# Patient Record
Sex: FEMALE | Race: WHITE | NOT HISPANIC OR LATINO
[De-identification: names, ages, dates, MRNs, and addresses within clinical notes are randomized per-mention and may not be internally consistent; named-entity substitution may affect disease eponyms.]

---

## 2017-01-18 ENCOUNTER — CHARTING TRANS (OUTPATIENT)
Dept: OTHER | Age: 52
End: 2017-01-18

## 2017-01-18 ASSESSMENT — PAIN SCALES - GENERAL: PAINLEVEL_OUTOF10: 0

## 2017-02-21 ENCOUNTER — HOSPITAL ENCOUNTER (OUTPATIENT)
Age: 52
Discharge: HOME | End: 2017-02-21
Attending: EMERGENCY MEDICINE
Payer: COMMERCIAL

## 2017-02-21 VITALS
WEIGHT: 130 LBS | TEMPERATURE: 99 F | HEART RATE: 70 BPM | SYSTOLIC BLOOD PRESSURE: 114 MMHG | HEIGHT: 65 IN | OXYGEN SATURATION: 100 % | DIASTOLIC BLOOD PRESSURE: 57 MMHG | BODY MASS INDEX: 21.66 KG/M2 | RESPIRATION RATE: 20 BRPM

## 2017-02-21 DIAGNOSIS — J06.9 VIRAL UPPER RESPIRATORY TRACT INFECTION: Primary | ICD-10-CM

## 2017-02-21 LAB — S PYO AG THROAT QL: NEGATIVE

## 2017-02-21 PROCEDURE — 87430 STREP A AG IA: CPT

## 2017-02-21 PROCEDURE — 99212 OFFICE O/P EST SF 10 MIN: CPT

## 2017-02-21 NOTE — ED PROVIDER NOTES
Patient Seen in: [facility]    History   Patient presents with:  Sore Throat    Stated Complaint: check for strep    HPI    42-year-old female patient presents her complaining of 3 days of sore throat associated with fati diagnosis)    Disposition:  Discharge    Follow-up:  Your primary care physician      If symptoms worsen      Medications Prescribed:  There are no discharge medications for this patient.

## 2017-05-17 ENCOUNTER — CHARTING TRANS (OUTPATIENT)
Dept: OTHER | Age: 52
End: 2017-05-17

## 2017-05-17 ASSESSMENT — PAIN SCALES - GENERAL: PAINLEVEL_OUTOF10: 0

## 2017-05-22 ENCOUNTER — LAB SERVICES (OUTPATIENT)
Dept: OTHER | Age: 52
End: 2017-05-22

## 2017-06-07 ENCOUNTER — CHARTING TRANS (OUTPATIENT)
Dept: OTHER | Age: 52
End: 2017-06-07

## 2017-06-07 LAB
ALBUMIN SERPL-MCNC: 4 G/DL (ref 3.6–5.1)
ALBUMIN/GLOB SERPL: 1.3 (ref 1–2.4)
ALP SERPL-CCNC: 51 UNITS/L (ref 45–117)
ALT SERPL-CCNC: 33 UNITS/L
ANION GAP SERPL CALC-SCNC: 12 MMOL/L (ref 10–20)
AST SERPL-CCNC: 24 UNITS/L
BASOPHILS # BLD: 0 K/MCL (ref 0–0.3)
BASOPHILS NFR BLD: 1 %
BILIRUB SERPL-MCNC: 0.6 MG/DL (ref 0.2–1)
BUN SERPL-MCNC: 12 MG/DL (ref 6–20)
BUN/CREAT SERPL: 16 (ref 7–25)
CALCIUM SERPL-MCNC: 9 MG/DL (ref 8.4–10.2)
CHLORIDE SERPL-SCNC: 104 MMOL/L (ref 98–107)
CHOLEST SERPL-MCNC: 197 MG/DL
CHOLEST/HDLC SERPL: 1.7
CO2 SERPL-SCNC: 30 MMOL/L (ref 21–32)
CREAT SERPL-MCNC: 0.74 MG/DL (ref 0.51–0.95)
DIFFERENTIAL METHOD BLD: ABNORMAL
EOSINOPHIL # BLD: 0.2 K/MCL (ref 0.1–0.5)
EOSINOPHIL NFR BLD: 3 %
ERYTHROCYTE [DISTWIDTH] IN BLOOD: 12 % (ref 11–15)
GLOBULIN SER-MCNC: 3.1 G/DL (ref 2–4)
GLUCOSE SERPL-MCNC: 96 MG/DL (ref 65–99)
HDLC SERPL-MCNC: 115 MG/DL
HEMATOCRIT: 40.8 % (ref 36–46.5)
HEMOGLOBIN: 14 G/DL (ref 12–15.5)
LDLC SERPL CALC-MCNC: 74 MG/DL
LENGTH OF FAST TIME PATIENT: 12 HRS
LENGTH OF FAST TIME PATIENT: 12 HRS
LYMPHOCYTES # BLD: 1.4 K/MCL (ref 1–4)
LYMPHOCYTES NFR BLD: 30 %
MEAN CORPUSCULAR HEMOGLOBIN: 32.6 PG (ref 26–34)
MEAN CORPUSCULAR HGB CONC: 34.3 G/DL (ref 32–36.5)
MEAN CORPUSCULAR VOLUME: 94.9 FL (ref 78–100)
MONOCYTES # BLD: 0.5 K/MCL (ref 0.3–0.9)
MONOCYTES NFR BLD: 11 %
NEUTROPHILS # BLD: 2.6 K/MCL (ref 1.8–7.7)
NEUTROPHILS NFR BLD: 55 %
NONHDLC SERPL-MCNC: 82 MG/DL
PLATELET COUNT: 203 K/MCL (ref 140–450)
POTASSIUM SERPL-SCNC: 5.3 MMOL/L (ref 3.4–5.1)
RED CELL COUNT: 4.3 MIL/MCL (ref 4–5.2)
SODIUM SERPL-SCNC: 141 MMOL/L (ref 135–145)
TOTAL PROTEIN: 7.1 G/DL (ref 6.4–8.2)
TRIGL SERPL-MCNC: 41 MG/DL
TSH SERPL-ACNC: 2.48 MCUNITS/ML (ref 0.35–5)
WHITE BLOOD COUNT: 4.7 K/MCL (ref 4.2–11)

## 2017-06-28 ENCOUNTER — CHARTING TRANS (OUTPATIENT)
Dept: OTHER | Age: 52
End: 2017-06-28

## 2017-07-12 ENCOUNTER — HOSPITAL (OUTPATIENT)
Dept: OTHER | Age: 52
End: 2017-07-12
Attending: INTERNAL MEDICINE

## 2017-07-12 ENCOUNTER — IMAGING SERVICES (OUTPATIENT)
Dept: OTHER | Age: 52
End: 2017-07-12

## 2017-07-15 ENCOUNTER — CHARTING TRANS (OUTPATIENT)
Dept: OTHER | Age: 52
End: 2017-07-15

## 2017-09-26 ENCOUNTER — HOSPITAL ENCOUNTER (OUTPATIENT)
Age: 52
Discharge: HOME | End: 2017-09-26
Attending: EMERGENCY MEDICINE
Payer: COMMERCIAL

## 2017-09-26 VITALS
HEART RATE: 68 BPM | SYSTOLIC BLOOD PRESSURE: 111 MMHG | DIASTOLIC BLOOD PRESSURE: 66 MMHG | WEIGHT: 125 LBS | TEMPERATURE: 98 F | BODY MASS INDEX: 20.83 KG/M2 | HEIGHT: 65 IN | OXYGEN SATURATION: 100 % | RESPIRATION RATE: 18 BRPM

## 2017-09-26 DIAGNOSIS — J02.0 ACUTE STREPTOCOCCAL PHARYNGITIS: Primary | ICD-10-CM

## 2017-09-26 LAB — S PYO AG THROAT QL: POSITIVE

## 2017-09-26 PROCEDURE — 99213 OFFICE O/P EST LOW 20 MIN: CPT

## 2017-09-26 PROCEDURE — 87430 STREP A AG IA: CPT

## 2017-09-26 RX ORDER — CEPHALEXIN 500 MG/1
500 CAPSULE ORAL 3 TIMES DAILY
Qty: 30 CAPSULE | Refills: 0 | Status: SHIPPED | OUTPATIENT
Start: 2017-09-26 | End: 2017-10-06

## 2017-09-26 NOTE — ED INITIAL ASSESSMENT (HPI)
PATIENT ARRIVED AMBULATORY TO ROOM C/O A SORE THROAT X2 DAYS. PATIENT STATES HER SON WAS RECENTLY TREATED FOR STREP. NO N/V/D. NO FEVERS.

## 2017-09-26 NOTE — ED PROVIDER NOTES
Patient Seen in: [facility]    History   Patient presents with:  Sore Throat    Stated Complaint: sore throat    HPI    The patient is a 49-year-old female without significant past medical history who was son recently had Value    POCT Rapid Strep Positive (*)     All other components within normal limits       ============================================================  ED Course  ------------------------------------------------------------  MDM       Positive strep.   Guillermina Moore

## 2017-10-20 ENCOUNTER — HOSPITAL ENCOUNTER (OUTPATIENT)
Age: 52
Discharge: HOME | End: 2017-10-20
Payer: COMMERCIAL

## 2017-10-20 VITALS
HEART RATE: 94 BPM | TEMPERATURE: 98 F | SYSTOLIC BLOOD PRESSURE: 129 MMHG | OXYGEN SATURATION: 99 % | WEIGHT: 120 LBS | RESPIRATION RATE: 20 BRPM | BODY MASS INDEX: 19.99 KG/M2 | DIASTOLIC BLOOD PRESSURE: 65 MMHG | HEIGHT: 65 IN

## 2017-10-20 DIAGNOSIS — J02.0 STREPTOCOCCAL SORE THROAT: Primary | ICD-10-CM

## 2017-10-20 PROCEDURE — 87430 STREP A AG IA: CPT

## 2017-10-20 PROCEDURE — 99213 OFFICE O/P EST LOW 20 MIN: CPT

## 2017-10-20 PROCEDURE — 99214 OFFICE O/P EST MOD 30 MIN: CPT

## 2017-10-20 RX ORDER — CEPHALEXIN 500 MG/1
500 CAPSULE ORAL 4 TIMES DAILY
Qty: 40 CAPSULE | Refills: 0 | Status: SHIPPED | OUTPATIENT
Start: 2017-10-20 | End: 2017-10-30

## 2017-10-20 NOTE — ED INITIAL ASSESSMENT (HPI)
PATIENT ARRIVED AMBULATORY TO ROOM C/O A SORE THROAT THAT STARTED LAST NIGHT. +NAUSEA. NO V/D. +FATIGUE. NO FEVERS. NO COUGH. NO NASAL CONGESTION. NO DISTRESS.

## 2017-10-20 NOTE — ED PROVIDER NOTES
Patient presents with:  Sore Throat      HPI:     Kayla Jackson is a 45year old female who presents for evaluation of a chief complaint of sore throat, body aches, fatigue, and nausea that started last night.   Patient states she completed a cours midline and airway patent  LUNGS: clear to auscultation bilaterally; no rales, rhonchi, or wheezes    MDM/Assessment/Plan:   Orders for this encounter:      Orders Placed This Encounter      POCT Rapid Strep Once      POCT Rapid Strep      cephALEXin (SALMAFL

## 2017-11-08 ENCOUNTER — CHARTING TRANS (OUTPATIENT)
Dept: OTHER | Age: 52
End: 2017-11-08

## 2017-11-08 ASSESSMENT — PAIN SCALES - GENERAL: PAINLEVEL_OUTOF10: 0

## 2017-11-22 ENCOUNTER — HOSPITAL ENCOUNTER (OUTPATIENT)
Age: 52
Discharge: HOME | End: 2017-11-22
Attending: EMERGENCY MEDICINE
Payer: COMMERCIAL

## 2017-11-22 VITALS
TEMPERATURE: 97 F | HEIGHT: 65 IN | WEIGHT: 123 LBS | SYSTOLIC BLOOD PRESSURE: 123 MMHG | OXYGEN SATURATION: 100 % | BODY MASS INDEX: 20.49 KG/M2 | DIASTOLIC BLOOD PRESSURE: 67 MMHG | HEART RATE: 70 BPM | RESPIRATION RATE: 18 BRPM

## 2017-11-22 DIAGNOSIS — J02.0 STREPTOCOCCAL PHARYNGITIS: Primary | ICD-10-CM

## 2017-11-22 PROCEDURE — 99214 OFFICE O/P EST MOD 30 MIN: CPT

## 2017-11-22 PROCEDURE — 87430 STREP A AG IA: CPT

## 2017-11-22 PROCEDURE — 99213 OFFICE O/P EST LOW 20 MIN: CPT

## 2017-11-22 RX ORDER — AZITHROMYCIN 250 MG/1
TABLET, FILM COATED ORAL
Qty: 1 PACKAGE | Refills: 0 | Status: SHIPPED | OUTPATIENT
Start: 2017-11-22 | End: 2017-11-27

## 2017-11-22 NOTE — ED PROVIDER NOTES
Patient Seen in: 5 [facility]    History   Patient presents with:  Sore Throat    Stated Complaint: SORE THROAT    HPI    26-year-old female presents complaining of 2 days of sore throat.   She reports mildly favoring the le Neurological: She is alert and oriented to person, place, and time. Coordination normal.   Skin: Skin is dry. Psychiatric: She has a normal mood and affect. Nursing note and vitals reviewed.             ED Course     Labs Reviewed   [facility] POCT RAPID STR

## 2017-11-22 NOTE — ED INITIAL ASSESSMENT (HPI)
2 days of congestion and sore throat. + headache. Low grade temp. No cough. Denies ear pain. No nausea.

## 2017-11-28 ENCOUNTER — OFFICE VISIT (OUTPATIENT)
Dept: OTOLARYNGOLOGY | Facility: CLINIC | Age: 52
End: 2017-11-28

## 2017-11-28 VITALS
HEART RATE: 72 BPM | DIASTOLIC BLOOD PRESSURE: 68 MMHG | HEIGHT: 65 IN | RESPIRATION RATE: 18 BRPM | WEIGHT: 123 LBS | BODY MASS INDEX: 20.49 KG/M2 | TEMPERATURE: 98 F | SYSTOLIC BLOOD PRESSURE: 124 MMHG

## 2017-11-28 DIAGNOSIS — J03.90 TONSILLITIS: Primary | ICD-10-CM

## 2017-11-28 PROCEDURE — 99212 OFFICE O/P EST SF 10 MIN: CPT | Performed by: OTOLARYNGOLOGY

## 2017-11-28 PROCEDURE — 99203 OFFICE O/P NEW LOW 30 MIN: CPT | Performed by: OTOLARYNGOLOGY

## 2017-11-29 NOTE — PROGRESS NOTES
Cristopher Judd is a 45year old female. Patient presents with:  Consult: New pt, presents d/t chrinic strep throat. 6 times in past two years and 3 since september. HPI:   She has had problems with strep throat for the last 2 years.  She had 3 i Supraclavicular. Eyes Normal Conjunctiva - Right: Normal, Left: Normal. Pupil - Right: Normal, Left: Normal.    Ears Normal Inspection - Right: Normal, Left: Normal. Canal - Left: Normal. TM - Right: Normal, Left: Normal.     ASSESSMENT AND PLAN:   1.  To

## 2018-01-02 ENCOUNTER — HOSPITAL ENCOUNTER (OUTPATIENT)
Age: 53
Discharge: HOME | End: 2018-01-02
Payer: COMMERCIAL

## 2018-01-02 VITALS
HEIGHT: 65 IN | HEART RATE: 70 BPM | SYSTOLIC BLOOD PRESSURE: 127 MMHG | WEIGHT: 123 LBS | OXYGEN SATURATION: 100 % | DIASTOLIC BLOOD PRESSURE: 67 MMHG | BODY MASS INDEX: 20.49 KG/M2 | RESPIRATION RATE: 16 BRPM | TEMPERATURE: 98 F

## 2018-01-02 DIAGNOSIS — J02.0 STREPTOCOCCAL SORE THROAT: Primary | ICD-10-CM

## 2018-01-02 LAB — S PYO AG THROAT QL: POSITIVE

## 2018-01-02 PROCEDURE — 99214 OFFICE O/P EST MOD 30 MIN: CPT

## 2018-01-02 PROCEDURE — 87430 STREP A AG IA: CPT

## 2018-01-02 PROCEDURE — 99213 OFFICE O/P EST LOW 20 MIN: CPT

## 2018-01-02 RX ORDER — CEFDINIR 300 MG/1
300 CAPSULE ORAL 2 TIMES DAILY
Qty: 20 CAPSULE | Refills: 0 | Status: SHIPPED | OUTPATIENT
Start: 2018-01-02 | End: 2018-01-12

## 2018-01-02 NOTE — ED INITIAL ASSESSMENT (HPI)
PATIENT ARRIVED AMBULATORY TO ROOM C/O A SORE THROAT THAT STARTED 2 DAYS AGO. PATIENT STATES SHE HAS HAD STREP SEVERAL TIMES IN THE PAST FEW MONTHS. NO N/V/D. NO COUGH. NO FEVER.

## 2018-01-02 NOTE — ED PROVIDER NOTES
Patient presents with:  Sore Throat      HPI:     Huseyin Ayers is a 45year old female who presents for evaluation of a chief complaint of sore throat, fatigue, and body aches for the past couple days.   The patient states she has had strep throat No meningismus.   CARDIO: RRR without murmur  EXTREMITIES: no cyanosis, clubbing or edema  HEAD: normocephalic, atraumatic  EYES: sclera non icteric bilateral, conjunctiva clear  EARS: TM  bilateral: normal  NOSE: nasal turbinates: pink, normal mucosa  THRO days

## 2018-01-05 ENCOUNTER — TELEPHONE (OUTPATIENT)
Dept: OTOLARYNGOLOGY | Facility: CLINIC | Age: 53
End: 2018-01-05

## 2018-01-05 NOTE — TELEPHONE ENCOUNTER
Pt recently seen in urgent care on  for strep throat. Pt was prescribed Cefdinir. Pt LOV 17. Pt would like to know if  would recommend a different antibiotic for her. Please advise. Thank you.

## 2018-01-06 NOTE — TELEPHONE ENCOUNTER
Pt was seen in urgent care 1/, diagnosed with strep throat, Rx for clindamycin was recommended, but pt had heard about issues w/ CDiff, so pt was given Rx for Cefdinir, 10 days.   Pt wants to know if  feels the Cefdinir if okay, will it be as effective,

## 2018-01-09 NOTE — TELEPHONE ENCOUNTER
Pt informed per  the Cefdinir is okay to take; she should only f/u after she completes the antibiotic if she is not any better after completing regimen. Pt verbalized understanding.

## 2018-01-10 ENCOUNTER — IMAGING SERVICES (OUTPATIENT)
Dept: OTHER | Age: 53
End: 2018-01-10

## 2018-01-10 ENCOUNTER — HOSPITAL (OUTPATIENT)
Dept: OTHER | Age: 53
End: 2018-01-10
Attending: INTERNAL MEDICINE

## 2018-01-13 ENCOUNTER — CHARTING TRANS (OUTPATIENT)
Dept: OTHER | Age: 53
End: 2018-01-13

## 2018-01-17 ENCOUNTER — OFFICE VISIT (OUTPATIENT)
Dept: OTOLARYNGOLOGY | Facility: CLINIC | Age: 53
End: 2018-01-17

## 2018-01-17 VITALS
SYSTOLIC BLOOD PRESSURE: 108 MMHG | TEMPERATURE: 99 F | DIASTOLIC BLOOD PRESSURE: 69 MMHG | BODY MASS INDEX: 20.49 KG/M2 | HEIGHT: 65 IN | WEIGHT: 123 LBS

## 2018-01-17 DIAGNOSIS — J03.90 TONSILLITIS: Primary | ICD-10-CM

## 2018-01-17 PROCEDURE — 99213 OFFICE O/P EST LOW 20 MIN: CPT | Performed by: OTOLARYNGOLOGY

## 2018-01-17 PROCEDURE — 99214 OFFICE O/P EST MOD 30 MIN: CPT | Performed by: OTOLARYNGOLOGY

## 2018-01-17 NOTE — PROGRESS NOTES
Denise Prasad is a 45year old female. Patient presents with: Tonsil Problem: recurrent strep throat, 4x since september 2017      HISTORY OF PRESENT ILLNESS  1/17/2018  Patient prevents for evaluation of sore throats. This is recurrent.   Recent Neuro Negative Tremors. Psych Negative Anxiety and depression. Integumentary Negative Frequent skin infections, pigment change and rash. Hema/Lymph Negative Easy bleeding and easy bruising.            PHYSICAL EXAM    /69   Temp 98.6 °F (37 °C Future  - AEROBIC BACTERIAL CULTURE            Edilia Lujan MD

## 2018-01-23 ENCOUNTER — HOSPITAL (OUTPATIENT)
Dept: OTHER | Age: 53
End: 2018-01-23
Attending: INTERNAL MEDICINE

## 2018-01-23 ENCOUNTER — IMAGING SERVICES (OUTPATIENT)
Dept: OTHER | Age: 53
End: 2018-01-23

## 2018-01-30 PROBLEM — E55.9 VITAMIN D DEFICIENCY: Status: ACTIVE | Noted: 2018-01-30

## 2018-01-30 PROBLEM — I73.00 RAYNAUD'S DISEASE WITHOUT GANGRENE: Status: ACTIVE | Noted: 2018-01-30

## 2018-01-31 ENCOUNTER — HOSPITAL ENCOUNTER (OUTPATIENT)
Age: 53
Discharge: HOME | End: 2018-01-31
Payer: COMMERCIAL

## 2018-01-31 VITALS
SYSTOLIC BLOOD PRESSURE: 121 MMHG | HEART RATE: 80 BPM | OXYGEN SATURATION: 100 % | HEIGHT: 65 IN | TEMPERATURE: 98 F | DIASTOLIC BLOOD PRESSURE: 60 MMHG | WEIGHT: 125 LBS | BODY MASS INDEX: 20.83 KG/M2 | RESPIRATION RATE: 18 BRPM

## 2018-01-31 DIAGNOSIS — J02.0 STREPTOCOCCAL SORE THROAT: Primary | ICD-10-CM

## 2018-01-31 LAB — S PYO AG THROAT QL: POSITIVE

## 2018-01-31 PROCEDURE — 99213 OFFICE O/P EST LOW 20 MIN: CPT

## 2018-01-31 PROCEDURE — 99214 OFFICE O/P EST MOD 30 MIN: CPT

## 2018-01-31 PROCEDURE — 87430 STREP A AG IA: CPT

## 2018-01-31 RX ORDER — AZITHROMYCIN 500 MG/1
500 TABLET, FILM COATED ORAL DAILY
Qty: 5 TABLET | Refills: 0 | Status: SHIPPED | OUTPATIENT
Start: 2018-01-31 | End: 2018-02-05

## 2018-01-31 NOTE — ED PROVIDER NOTES
Patient presents with:  Sore Throat      HPI:     Jaison Munoz is a 45year old female who presents for evaluation of a chief complaint of sore throat and exposure to strep throat.   The patient has had frequent strep infections for the past 6 mon BMI 20.80 kg/m²   GENERAL: well developed, well nourished, well hydrated, no distress  SKIN: good skin turgor, no obvious rashes  NECK: supple, no adenopathy, no meningismus.   CARDIO: RRR without murmur  EXTREMITIES: no cyanosis, clubbing or edema  HEAD: n

## 2018-01-31 NOTE — ED INITIAL ASSESSMENT (HPI)
Treated multiple times for strep. Last completed antibiotics 1/12/18. Follow up with Dr. Jamila Delgado, strep culture came back negative. Son + last week. Symptoms returned 2 days ago. No fever. + headache and fatigue.

## 2018-02-06 ENCOUNTER — OFFICE VISIT (OUTPATIENT)
Dept: OTOLARYNGOLOGY | Facility: CLINIC | Age: 53
End: 2018-02-06

## 2018-02-06 VITALS
DIASTOLIC BLOOD PRESSURE: 67 MMHG | SYSTOLIC BLOOD PRESSURE: 122 MMHG | TEMPERATURE: 98 F | WEIGHT: 122 LBS | HEIGHT: 65 IN | BODY MASS INDEX: 20.33 KG/M2

## 2018-02-06 DIAGNOSIS — J03.90 TONSILLITIS: Primary | ICD-10-CM

## 2018-02-06 PROCEDURE — 99212 OFFICE O/P EST SF 10 MIN: CPT | Performed by: OTOLARYNGOLOGY

## 2018-02-06 PROCEDURE — 99213 OFFICE O/P EST LOW 20 MIN: CPT | Performed by: OTOLARYNGOLOGY

## 2018-02-06 NOTE — PROGRESS NOTES
Natalia Reyna is a 45year old female. Patient presents with:  Throat Problem: pt had strep throat 5 times since Sept 2017    HPI:   She has had 2 more episodes of strep throat since I saw her last in the fall.   She just finished taking a course o Exam Normal Inspection - Normal. Palpation - Normal. Parotid gland - Normal. Thyroid gland - Normal.   Psychiatric Normal Orientation - Oriented to time, place, person & situation. Appropriate mood and affect.    Lymph Detail Normal Submental. Submandibular

## 2018-03-06 ENCOUNTER — LAB ENCOUNTER (OUTPATIENT)
Dept: LAB | Age: 53
End: 2018-03-06
Attending: ALLERGY & IMMUNOLOGY
Payer: COMMERCIAL

## 2018-03-06 DIAGNOSIS — I43 DILATED CARDIOMYOPATHY SECONDARY TO INFECTION (HCC): Primary | ICD-10-CM

## 2018-03-06 DIAGNOSIS — B99.9 DILATED CARDIOMYOPATHY SECONDARY TO INFECTION (HCC): Primary | ICD-10-CM

## 2018-03-06 PROCEDURE — 86317 IMMUNOASSAY INFECTIOUS AGENT: CPT | Performed by: ALLERGY & IMMUNOLOGY

## 2018-03-06 PROCEDURE — 86060 ANTISTREPTOLYSIN O TITER: CPT

## 2018-03-06 PROCEDURE — 82784 ASSAY IGA/IGD/IGG/IGM EACH: CPT | Performed by: ALLERGY & IMMUNOLOGY

## 2018-03-08 LAB — ANTISTREPTOLYSIN O ANTIBODY: 94 IU/ML

## 2018-04-08 ENCOUNTER — HOSPITAL ENCOUNTER (OUTPATIENT)
Age: 53
Discharge: HOME | End: 2018-04-08
Attending: EMERGENCY MEDICINE
Payer: COMMERCIAL

## 2018-04-08 VITALS
BODY MASS INDEX: 20.83 KG/M2 | SYSTOLIC BLOOD PRESSURE: 138 MMHG | HEART RATE: 75 BPM | WEIGHT: 125 LBS | RESPIRATION RATE: 16 BRPM | DIASTOLIC BLOOD PRESSURE: 67 MMHG | OXYGEN SATURATION: 100 % | TEMPERATURE: 98 F | HEIGHT: 65 IN

## 2018-04-08 DIAGNOSIS — J02.0 STREP PHARYNGITIS: Primary | ICD-10-CM

## 2018-04-08 PROCEDURE — 99213 OFFICE O/P EST LOW 20 MIN: CPT

## 2018-04-08 PROCEDURE — 99214 OFFICE O/P EST MOD 30 MIN: CPT

## 2018-04-08 PROCEDURE — 87430 STREP A AG IA: CPT

## 2018-04-08 RX ORDER — AMOXICILLIN 875 MG/1
875 TABLET, COATED ORAL 2 TIMES DAILY
Qty: 20 TABLET | Refills: 0 | Status: SHIPPED | OUTPATIENT
Start: 2018-04-08 | End: 2018-04-18

## 2018-04-08 NOTE — ED PROVIDER NOTES
Patient Seen in: [facility]    History   Patient presents with:  Sore Throat    Stated Complaint: SORE THROAT (STREP?)    HPI    Patient is a 47-year-old female that complains of a 2 day history of a sore throat she descr light.   Neck: Neck supple. Cardiovascular: Normal rate, regular rhythm, normal heart sounds and intact distal pulses. Pulmonary/Chest: Effort normal and breath sounds normal. No respiratory distress. Abdominal: Soft.  Bowel sounds are normal. Exhibi

## 2018-04-23 ENCOUNTER — TELEPHONE (OUTPATIENT)
Dept: FAMILY MEDICINE CLINIC | Facility: CLINIC | Age: 53
End: 2018-04-23

## 2018-04-24 ENCOUNTER — OFFICE VISIT (OUTPATIENT)
Dept: FAMILY MEDICINE CLINIC | Facility: CLINIC | Age: 53
End: 2018-04-24

## 2018-04-24 VITALS
DIASTOLIC BLOOD PRESSURE: 58 MMHG | SYSTOLIC BLOOD PRESSURE: 92 MMHG | RESPIRATION RATE: 14 BRPM | WEIGHT: 123 LBS | HEIGHT: 65 IN | HEART RATE: 64 BPM | BODY MASS INDEX: 20.49 KG/M2 | TEMPERATURE: 98 F

## 2018-04-24 DIAGNOSIS — J03.01 RECURRENT STREPTOCOCCAL TONSILLITIS: Primary | ICD-10-CM

## 2018-04-24 DIAGNOSIS — R21 RASH AND NONSPECIFIC SKIN ERUPTION: ICD-10-CM

## 2018-04-24 PROCEDURE — 87081 CULTURE SCREEN ONLY: CPT | Performed by: FAMILY MEDICINE

## 2018-04-24 PROCEDURE — 99204 OFFICE O/P NEW MOD 45 MIN: CPT | Performed by: FAMILY MEDICINE

## 2018-04-24 RX ORDER — MULTIVIT,IRON,MINERALS/LUTEIN
TABLET ORAL
COMMUNITY

## 2018-04-24 NOTE — PROGRESS NOTES
Chief Complaint:  Patient presents with:  Urgent Care F/u: Pt just finished antibiotics for strep. Pt states she has had strep about 6x since September.      HPI:  This is a 45year old female patient presenting for Urgent Care F/u (Pt just finished antibio recommended she start clindamycin. Patient decided not to pursue this after reading the potential side effects.   It was then recommended she have a tonsillectomy however she cannot be guaranteed that this would not cause recurrent strep so she did not pur reviewed. No pertinent surgical history.    Family History   Problem Relation Age of Onset   • Diabetes Father    • Hypertension Father    • Hypertension Mother    • Heart Disorder Mother      rheumatic fever   • Thyroid Disorder Sister    • Arthritis Siste tonsillitis  Will retest for clearance of strep  Currently. IF she has cleared this, I would recommend monitoring. She may benefit from tonsillectomy. Consider treating of the dog and testing of her   To see if either are carriers.  Next step would b

## 2018-05-16 ENCOUNTER — HOSPITAL ENCOUNTER (OUTPATIENT)
Age: 53
Discharge: HOME | End: 2018-05-16
Payer: COMMERCIAL

## 2018-05-16 VITALS
SYSTOLIC BLOOD PRESSURE: 127 MMHG | RESPIRATION RATE: 18 BRPM | TEMPERATURE: 99 F | HEART RATE: 68 BPM | OXYGEN SATURATION: 97 % | DIASTOLIC BLOOD PRESSURE: 71 MMHG

## 2018-05-16 DIAGNOSIS — J02.0 STREPTOCOCCAL SORE THROAT: Primary | ICD-10-CM

## 2018-05-16 PROCEDURE — 99214 OFFICE O/P EST MOD 30 MIN: CPT

## 2018-05-16 PROCEDURE — 87430 STREP A AG IA: CPT

## 2018-05-16 PROCEDURE — 99213 OFFICE O/P EST LOW 20 MIN: CPT

## 2018-05-16 RX ORDER — AZITHROMYCIN 500 MG/1
500 TABLET, FILM COATED ORAL DAILY
Qty: 5 TABLET | Refills: 0 | Status: SHIPPED | OUTPATIENT
Start: 2018-05-16 | End: 2018-05-21

## 2018-05-16 NOTE — ED INITIAL ASSESSMENT (HPI)
PATIENT REQUESTING STREP CHECK. PATIENT REPORTING MILD SYMPTOMS INCLUDING FATIGUE AND HEADACHE, STATES SHE HAS HAD STREP X 7 TIMES THIS YEAR. DENIES FEVERS.

## 2018-05-16 NOTE — ED PROVIDER NOTES
Patient presents with:  Sore Throat      HPI:     Jamaal Soto is a 52year old female who presents for evaluation of a chief complaint of a sore throat, fatigue, and body aches that started today. Positive sick contacts at home.   No difficulty negative. Constitutional and Vital Signs Reviewed.     Physical Exam:      /71   Pulse 68   Temp 99.1 °F (37.3 °C) (Oral)   Resp 18   SpO2 97%   GENERAL: well developed, well nourished, well hydrated, no distress  SKIN: good skin turgor, no obvious r [address] [facility] [id number] [phone]    Schedule an appointment as soon as possible for a visit   As needed

## 2018-05-24 ENCOUNTER — PATIENT MESSAGE (OUTPATIENT)
Dept: FAMILY MEDICINE CLINIC | Facility: CLINIC | Age: 53
End: 2018-05-24

## 2018-05-25 RX ORDER — AZITHROMYCIN 250 MG/1
TABLET, FILM COATED ORAL
Qty: 6 TABLET | Refills: 0 | Status: SHIPPED | OUTPATIENT
Start: 2018-05-25 | End: 2018-07-31 | Stop reason: ALTCHOICE

## 2018-05-25 NOTE — TELEPHONE ENCOUNTER
From: Lorena Agosto  To: Jeffery Duran DO  Sent: 5/24/2018 8:07 PM CDT  Subject: Prescription Question    Hi Dr. Earlene Betancourt, I hope I'm not being a pest, but I am wondering if you were going to call or send in that 2nd round of antibiotics we were t

## 2018-06-14 ENCOUNTER — HOSPITAL ENCOUNTER (OUTPATIENT)
Age: 53
Discharge: HOME | End: 2018-06-14
Payer: COMMERCIAL

## 2018-06-14 ENCOUNTER — LAB ENCOUNTER (OUTPATIENT)
Dept: LAB | Age: 53
End: 2018-06-14
Attending: INTERNAL MEDICINE
Payer: COMMERCIAL

## 2018-06-14 VITALS
DIASTOLIC BLOOD PRESSURE: 73 MMHG | WEIGHT: 123 LBS | OXYGEN SATURATION: 100 % | RESPIRATION RATE: 16 BRPM | BODY MASS INDEX: 20.49 KG/M2 | TEMPERATURE: 99 F | HEART RATE: 76 BPM | SYSTOLIC BLOOD PRESSURE: 124 MMHG | HEIGHT: 65 IN

## 2018-06-14 DIAGNOSIS — J31.2 CHRONIC SORE THROAT: Primary | ICD-10-CM

## 2018-06-14 DIAGNOSIS — J02.9 SORE THROAT: Primary | ICD-10-CM

## 2018-06-14 PROCEDURE — 99212 OFFICE O/P EST SF 10 MIN: CPT

## 2018-06-14 PROCEDURE — 87430 STREP A AG IA: CPT

## 2018-06-14 PROCEDURE — 87081 CULTURE SCREEN ONLY: CPT | Performed by: NURSE PRACTITIONER

## 2018-06-14 NOTE — ED PROVIDER NOTES
Patient presents with:  Sore Throat      HPI:     Leonidtis Roby is a 52year old female who presents for evaluation of a chief complaint of sore throat for the past day. The patient has a long hx of frequent strep throat infections.  She just compl Resp 16   Ht 165.1 cm (5' 5\")   Wt 55.8 kg   SpO2 100%   BMI 20.47 kg/m²   GENERAL: well developed, well nourished, well hydrated, no distress  SKIN: good skin turgor, no obvious rashes  NECK: supple, no adenopathy, no thyromegaly. No meningismus.   CARD

## 2018-07-31 ENCOUNTER — HOSPITAL ENCOUNTER (OUTPATIENT)
Age: 53
Discharge: HOME | End: 2018-07-31
Payer: COMMERCIAL

## 2018-07-31 VITALS
DIASTOLIC BLOOD PRESSURE: 70 MMHG | OXYGEN SATURATION: 100 % | HEART RATE: 98 BPM | TEMPERATURE: 99 F | SYSTOLIC BLOOD PRESSURE: 143 MMHG | RESPIRATION RATE: 18 BRPM

## 2018-07-31 DIAGNOSIS — J02.9 VIRAL PHARYNGITIS: Primary | ICD-10-CM

## 2018-07-31 LAB
HETEROPH AB SER QL: NEGATIVE
S PYO AG THROAT QL: NEGATIVE

## 2018-07-31 PROCEDURE — 87430 STREP A AG IA: CPT

## 2018-07-31 PROCEDURE — 99213 OFFICE O/P EST LOW 20 MIN: CPT

## 2018-07-31 PROCEDURE — 86308 HETEROPHILE ANTIBODY SCREEN: CPT | Performed by: NURSE PRACTITIONER

## 2018-07-31 NOTE — ED PROVIDER NOTES
Patient presents with:  Sore Throat      HPI:     Ayde Case is a 52year old female who presents for evaluation of a chief complaint of sore throat, fatigue, body aches, and low-grade fevers as high as 99.9 for the past couple days.   The patie SpO2 100%   GENERAL: well developed, well nourished, well hydrated, no distress  SKIN: good skin turgor, no obvious rashes  NECK: supple, no adenopathy, no thyromegaly. No meningismus.   CARDIO: RRR without murmur  EXTREMITIES: no cyanosis, clubbing or christelle

## 2018-07-31 NOTE — ED INITIAL ASSESSMENT (HPI)
PATIENT ARRIVED AMBULATORY TO ROOM C/O A SORE THROAT X2 DAYS. PATIENT STATES \"I'VE HAD STREP 8 TIMES THIS YEAR AND I FEEL IT COMING ON\" +INTERMITTENT NAUSEA. NO V/D. NO FEVERS. NO COUGH. NO NASAL CONGESTION. EASY NON LABORED RESPIRATIONS.

## 2018-09-25 ENCOUNTER — HOSPITAL ENCOUNTER (OUTPATIENT)
Dept: PHYSICAL THERAPY | Facility: HOSPITAL | Age: 53
Setting detail: THERAPIES SERIES
Discharge: HOME | End: 2018-09-25
Attending: FAMILY MEDICINE
Payer: COMMERCIAL

## 2018-09-25 DIAGNOSIS — M75.102 ROTATOR CUFF SYNDROME, LEFT: ICD-10-CM

## 2018-09-25 PROCEDURE — 97110 THERAPEUTIC EXERCISES: CPT | Performed by: PHYSICAL THERAPIST

## 2018-09-25 PROCEDURE — 97162 PT EVAL MOD COMPLEX 30 MIN: CPT | Performed by: PHYSICAL THERAPIST

## 2018-09-25 NOTE — PROGRESS NOTES
UPPER EXTREMITY EVALUATION:   Referring Physician: Dr. Leopold Drones  Diagnosis: Rotator cuff syndrome, left (M75.102)     Date of Service: 9/25/2018     PATIENT Sharyn Pineda is a 52year old y/o female who presents to therapy today with co Functional limitations include limited with most ADL's: theron those overhead.   .  PT is medically necessary to address these limitations.      Peggy Lee would benefit from skilled Physical Therapy to address the above impairments to increase ROM , strength and deodorant, don/doff shirts, and wash hair (8 visits)  · Pt will increase shoulder AROM to 85 deg ER and 90 deg ABD to reach and fasten seatbelt without pain (8 visits)  · Pt will increase shoulder AROM IR to 70 deg to be able to reach in back pocket, annaleeck

## 2018-09-27 ENCOUNTER — HOSPITAL ENCOUNTER (OUTPATIENT)
Dept: PHYSICAL THERAPY | Facility: HOSPITAL | Age: 53
Setting detail: THERAPIES SERIES
Discharge: HOME | End: 2018-09-27
Attending: FAMILY MEDICINE
Payer: COMMERCIAL

## 2018-09-27 PROCEDURE — 97140 MANUAL THERAPY 1/> REGIONS: CPT | Performed by: PHYSICAL THERAPIST

## 2018-09-27 PROCEDURE — 97110 THERAPEUTIC EXERCISES: CPT | Performed by: PHYSICAL THERAPIST

## 2018-09-27 NOTE — PROGRESS NOTES
Dx: Rotator cuff syndrome, left (M75.102)            Authorized # of Visits:  8         Next MD visit: none scheduled  Fall Risk: standard         Precautions: n/a             Subjective: 3/10 , primarily in lateral aspect of left shoulder.       Objective: x20                                     Skilled Services: TNE, posture , body mechanics, shoulder ROM     Charges: 2 te 1 manual        Total Timed Treatment: 40 min  Total Treatment Time: 45 min

## 2018-10-02 ENCOUNTER — HOSPITAL ENCOUNTER (OUTPATIENT)
Dept: PHYSICAL THERAPY | Facility: HOSPITAL | Age: 53
Setting detail: THERAPIES SERIES
Discharge: HOME | End: 2018-10-02
Attending: FAMILY MEDICINE
Payer: COMMERCIAL

## 2018-10-02 PROCEDURE — 97140 MANUAL THERAPY 1/> REGIONS: CPT | Performed by: PHYSICAL THERAPIST

## 2018-10-02 PROCEDURE — 97110 THERAPEUTIC EXERCISES: CPT | Performed by: PHYSICAL THERAPIST

## 2018-10-02 NOTE — PROGRESS NOTES
Dx: Rotator cuff syndrome, left (M75.102)          Authorized # of Visits:  8         Next MD visit: none scheduled  Fall Risk: standard         Precautions: n/a             Subjective: 3/10, didn't think it would hurt so bad in the back of my shoulder.

## 2018-10-04 ENCOUNTER — APPOINTMENT (OUTPATIENT)
Dept: PHYSICAL THERAPY | Facility: HOSPITAL | Age: 53
End: 2018-10-04
Attending: FAMILY MEDICINE
Payer: COMMERCIAL

## 2018-10-09 ENCOUNTER — HOSPITAL ENCOUNTER (OUTPATIENT)
Dept: PHYSICAL THERAPY | Facility: HOSPITAL | Age: 53
Setting detail: THERAPIES SERIES
Discharge: HOME | End: 2018-10-09
Attending: FAMILY MEDICINE
Payer: COMMERCIAL

## 2018-10-09 PROCEDURE — 97140 MANUAL THERAPY 1/> REGIONS: CPT | Performed by: PHYSICAL THERAPIST

## 2018-10-09 PROCEDURE — 97110 THERAPEUTIC EXERCISES: CPT | Performed by: PHYSICAL THERAPIST

## 2018-10-09 NOTE — PROGRESS NOTES
Dx: Rotator cuff syndrome, left (M75.102)          Authorized # of Visits:  8         Next MD visit: none scheduled  Fall Risk: standard         Precautions: n/a             Subjective:Was really achy  A couple of days ago, but then was better.   Loosens

## 2018-10-11 ENCOUNTER — APPOINTMENT (OUTPATIENT)
Dept: PHYSICAL THERAPY | Facility: HOSPITAL | Age: 53
End: 2018-10-11
Attending: FAMILY MEDICINE
Payer: COMMERCIAL

## 2018-10-16 ENCOUNTER — HOSPITAL ENCOUNTER (OUTPATIENT)
Dept: PHYSICAL THERAPY | Facility: HOSPITAL | Age: 53
Setting detail: THERAPIES SERIES
Discharge: HOME | End: 2018-10-16
Attending: FAMILY MEDICINE
Payer: COMMERCIAL

## 2018-10-16 PROCEDURE — 97140 MANUAL THERAPY 1/> REGIONS: CPT | Performed by: PHYSICAL THERAPIST

## 2018-10-16 PROCEDURE — 97110 THERAPEUTIC EXERCISES: CPT | Performed by: PHYSICAL THERAPIST

## 2018-10-16 NOTE — PROGRESS NOTES
Dx: Rotator cuff syndrome, left (M75.102)          Authorized # of Visits:  8         Next MD visit: none scheduled  Fall Risk: standard         Precautions: n/a             Subjective: Had to use my right arm to get money out of the drive through [place]

## 2018-10-18 ENCOUNTER — HOSPITAL ENCOUNTER (OUTPATIENT)
Dept: PHYSICAL THERAPY | Facility: HOSPITAL | Age: 53
Setting detail: THERAPIES SERIES
Discharge: HOME | End: 2018-10-18
Attending: FAMILY MEDICINE
Payer: COMMERCIAL

## 2018-10-18 PROCEDURE — 97110 THERAPEUTIC EXERCISES: CPT | Performed by: PHYSICAL THERAPIST

## 2018-10-18 PROCEDURE — 97140 MANUAL THERAPY 1/> REGIONS: CPT | Performed by: PHYSICAL THERAPIST

## 2018-10-19 NOTE — PROGRESS NOTES
Dx: Rotator cuff syndrome, left (M75.102)          Authorized # of Visits:  8         Next MD visit: none scheduled  Fall Risk: standard         Precautions: n/a             Subjective:Still have pain with reaching into the back of the  fridge, getting t the wall with red ball. X10  Wall walks up the wall with red ball.    X10  STm, PROM to left hsoduler all palnes with emphsis on ER/IR        Wall push ups                 Skilled Services: TNE, posture , body mechanics, shoulder ROM     Charges: 2 te 1 m

## 2018-10-27 ENCOUNTER — HOSPITAL ENCOUNTER (OUTPATIENT)
Dept: MRI IMAGING | Age: 53
Discharge: HOME | End: 2018-10-27
Attending: FAMILY MEDICINE
Payer: COMMERCIAL

## 2018-10-27 DIAGNOSIS — M25.512 LEFT SHOULDER PAIN, UNSPECIFIED CHRONICITY: ICD-10-CM

## 2018-10-27 DIAGNOSIS — M75.102 ROTATOR CUFF SYNDROME, LEFT: ICD-10-CM

## 2018-10-27 PROCEDURE — 73221 MRI JOINT UPR EXTREM W/O DYE: CPT | Performed by: FAMILY MEDICINE

## 2018-11-02 VITALS
RESPIRATION RATE: 12 BRPM | BODY MASS INDEX: 20.49 KG/M2 | OXYGEN SATURATION: 98 % | HEIGHT: 65 IN | HEART RATE: 74 BPM | TEMPERATURE: 98.7 F | WEIGHT: 123 LBS

## 2018-11-03 VITALS
RESPIRATION RATE: 12 BRPM | HEIGHT: 65 IN | TEMPERATURE: 98.1 F | BODY MASS INDEX: 20.83 KG/M2 | OXYGEN SATURATION: 100 % | HEART RATE: 80 BPM | WEIGHT: 125 LBS

## 2018-11-03 VITALS
DIASTOLIC BLOOD PRESSURE: 60 MMHG | HEIGHT: 65 IN | WEIGHT: 123.99 LBS | SYSTOLIC BLOOD PRESSURE: 108 MMHG | BODY MASS INDEX: 20.66 KG/M2

## 2018-11-05 VITALS
HEART RATE: 65 BPM | OXYGEN SATURATION: 98 % | RESPIRATION RATE: 12 BRPM | WEIGHT: 124 LBS | TEMPERATURE: 97 F | HEIGHT: 65 IN | BODY MASS INDEX: 20.66 KG/M2

## 2018-11-06 ENCOUNTER — HOSPITAL ENCOUNTER (OUTPATIENT)
Dept: PHYSICAL THERAPY | Facility: HOSPITAL | Age: 53
Setting detail: THERAPIES SERIES
Discharge: HOME | End: 2018-11-06
Attending: FAMILY MEDICINE
Payer: COMMERCIAL

## 2018-11-06 PROCEDURE — 97140 MANUAL THERAPY 1/> REGIONS: CPT | Performed by: PHYSICAL THERAPIST

## 2018-11-06 PROCEDURE — 97110 THERAPEUTIC EXERCISES: CPT | Performed by: PHYSICAL THERAPIST

## 2018-11-07 NOTE — PROGRESS NOTES
Dx: Rotator cuff syndrome, left (M75.102)          Authorized # of Visits:  8         Next MD visit: none scheduled  Fall Risk: standard         Precautions: n/a             Subjective:Saw the doctor, has an MRI and it showed that I have a rotator cuff t retraction  Scap movements with emphasis on retraction  Scap movements with emphasis on retraction  Scap movements with emphasis on retraction  ER with 2# x20 Sidelying er 2#x15   Serratus punches x20     ER with 1# x20  ER with 1# x20 ER with 1# x20 ER wi

## 2018-11-12 ENCOUNTER — HOSPITAL ENCOUNTER (OUTPATIENT)
Dept: PHYSICAL THERAPY | Facility: HOSPITAL | Age: 53
Setting detail: THERAPIES SERIES
Discharge: HOME | End: 2018-11-12
Attending: FAMILY MEDICINE
Payer: COMMERCIAL

## 2018-11-12 PROCEDURE — 97140 MANUAL THERAPY 1/> REGIONS: CPT | Performed by: PHYSICAL THERAPIST

## 2018-11-12 PROCEDURE — 97110 THERAPEUTIC EXERCISES: CPT | Performed by: PHYSICAL THERAPIST

## 2018-11-12 NOTE — PROGRESS NOTES
Dx: Rotator cuff syndrome, left (M75.102)          Authorized # of Visits:  8         Next MD visit: none scheduled  Fall Risk: standard         Precautions: n/a             Subjective:Feeling a little bit better, can tell my shoulder is moving better, d retraction  ER with 2# x20 Sidelying er 2#x15   Serratus punches x20  Sidelying er 2#x30   Serratus punches x30  Mini  presses x20 2#   ER with 1# x20  ER with 1# x20 ER with 1# x20 ER with 2# x20 Scap movements with emphasis on retraction stm to l

## 2018-11-16 ENCOUNTER — HOSPITAL ENCOUNTER (OUTPATIENT)
Dept: PHYSICAL THERAPY | Facility: HOSPITAL | Age: 53
Setting detail: THERAPIES SERIES
Discharge: HOME | End: 2018-11-16
Attending: FAMILY MEDICINE
Payer: COMMERCIAL

## 2018-11-16 PROCEDURE — 97140 MANUAL THERAPY 1/> REGIONS: CPT | Performed by: PHYSICAL THERAPIST

## 2018-11-16 PROCEDURE — 97110 THERAPEUTIC EXERCISES: CPT | Performed by: PHYSICAL THERAPIST

## 2018-11-16 NOTE — PROGRESS NOTES
Dx: Rotator cuff syndrome, left (M75.102)          Authorized # of Visits:  8         Next MD visit: none scheduled  Fall Risk: standard         Precautions: n/a             Subjective:Not sleeping well again at nite.   Wondering if can continue to go on emphasis on retraction  Scap movements with emphasis on retraction  Scap movements with emphasis on retraction  Scap movements with emphasis on retraction  ER with 2# x20 Sidelying er 2#x15   Serratus punches x20  Sidelying er 2#x30   Serratus punches x30

## 2018-11-20 ENCOUNTER — HOSPITAL ENCOUNTER (OUTPATIENT)
Dept: PHYSICAL THERAPY | Facility: HOSPITAL | Age: 53
Setting detail: THERAPIES SERIES
Discharge: HOME | End: 2018-11-20
Attending: FAMILY MEDICINE
Payer: COMMERCIAL

## 2018-11-20 PROCEDURE — 97140 MANUAL THERAPY 1/> REGIONS: CPT | Performed by: PHYSICAL THERAPIST

## 2018-11-20 PROCEDURE — 97110 THERAPEUTIC EXERCISES: CPT | Performed by: PHYSICAL THERAPIST

## 2018-11-20 NOTE — PROGRESS NOTES
Dx: Rotator cuff syndrome, left (M75.102)          Authorized # of Visits:  8         Next MD visit: none scheduled  Fall Risk: standard         Precautions: n/a             Subjective:Still really achey at nite, but not as bad as it was before. border  PROM all planes left shoulder STM to left shoulder, scap border  PROM all planes left shoulder   STM to left shoulder incl. Long head of biceps , post cuff mm. STM to left shoulder incl. Long head of biceps , post cuff mm.  STM to left shoulder in

## 2018-11-23 ENCOUNTER — HOSPITAL ENCOUNTER (OUTPATIENT)
Dept: PHYSICAL THERAPY | Facility: HOSPITAL | Age: 53
Setting detail: THERAPIES SERIES
Discharge: HOME | End: 2018-11-23
Attending: FAMILY MEDICINE
Payer: COMMERCIAL

## 2018-11-23 PROCEDURE — 97110 THERAPEUTIC EXERCISES: CPT | Performed by: PHYSICAL THERAPIST

## 2018-11-23 PROCEDURE — 97140 MANUAL THERAPY 1/> REGIONS: CPT | Performed by: PHYSICAL THERAPIST

## 2018-11-23 NOTE — PROGRESS NOTES
Dx: Rotator cuff syndrome, left (M75.102)          Authorized # of Visits:  8         Next MD visit: none scheduled  Fall Risk: standard         Precautions: n/a             Subjective: Think I am going to take  a break from PT, still having a lot of pain shoulder, scap border  PROM all planes left shoulder STM to left shoulder, scap border  PROM all planes left shoulder STM to left shoulder, scap border  PROM all planes left shoulder   STM to left shoulder incl. Long head of biceps , post cuff mm.    STM to

## 2018-11-30 ENCOUNTER — APPOINTMENT (OUTPATIENT)
Dept: PHYSICAL THERAPY | Facility: HOSPITAL | Age: 53
End: 2018-11-30
Attending: FAMILY MEDICINE
Payer: COMMERCIAL

## 2018-12-04 ENCOUNTER — TELEPHONE (OUTPATIENT)
Dept: SCHEDULING | Age: 53
End: 2018-12-04

## 2018-12-04 ENCOUNTER — APPOINTMENT (OUTPATIENT)
Dept: PHYSICAL THERAPY | Facility: HOSPITAL | Age: 53
End: 2018-12-04
Attending: FAMILY MEDICINE
Payer: COMMERCIAL

## 2018-12-13 PROBLEM — M75.122 COMPLETE TEAR OF LEFT ROTATOR CUFF: Status: ACTIVE | Noted: 2018-12-13

## 2018-12-13 PROBLEM — M75.02 ADHESIVE CAPSULITIS OF LEFT SHOULDER: Status: ACTIVE | Noted: 2018-12-13

## 2018-12-22 ENCOUNTER — APPOINTMENT (OUTPATIENT)
Dept: INTERNAL MEDICINE | Age: 53
End: 2018-12-22

## 2018-12-26 ENCOUNTER — OFFICE VISIT (OUTPATIENT)
Dept: PHYSICAL THERAPY | Age: 53
End: 2018-12-26
Attending: FAMILY MEDICINE
Payer: COMMERCIAL

## 2018-12-26 DIAGNOSIS — M75.120 COMPLETE RUPTURE OF ROTATOR CUFF: ICD-10-CM

## 2018-12-26 DIAGNOSIS — M75.02 ADHESIVE BURSITIS OF LEFT SHOULDER: ICD-10-CM

## 2018-12-26 PROCEDURE — 97110 THERAPEUTIC EXERCISES: CPT | Performed by: PHYSICAL THERAPIST

## 2018-12-26 PROCEDURE — 97162 PT EVAL MOD COMPLEX 30 MIN: CPT | Performed by: PHYSICAL THERAPIST

## 2018-12-26 NOTE — PROGRESS NOTES
SHOULDER EVALUATION:   Referring Physician: Dr. 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 Pt has been avoiding tasks and compensating during tasks that require her to use her arm above 90 degrees. Pt presents to PT with decreased L shoulder AROM, decreased shoulder/scapula strength, poor posture and pain.   Jolene Soares would benefit fr their symptoms. 2. Pt to exhibit L shoulder AROM to flex/abd 150, 60 ER, T10 IR with pain reported no higher than 2/10 in order to wash hair  3.  Pt to exhibit L shoulder MMT strength to 4+/5 consistently for in order for patient to put soup can into cabin

## 2018-12-27 ENCOUNTER — HOSPITAL (OUTPATIENT)
Dept: OTHER | Age: 53
End: 2018-12-27
Attending: INTERNAL MEDICINE

## 2018-12-27 LAB
BASOPHILS # BLD: 0.1 K/MCL (ref 0–0.3)
BASOPHILS NFR BLD: 1 %
DIFFERENTIAL METHOD BLD: ABNORMAL
EOSINOPHIL # BLD: 0 K/MCL (ref 0.1–0.5)
EOSINOPHIL NFR BLD: 0 %
IMM GRANULOCYTES # BLD AUTO: 0 K/MCL (ref 0–0.2)
IMM GRANULOCYTES NFR BLD: 0 %
LYMPHOCYTES # BLD: 1.4 K/MCL (ref 1–4)
LYMPHOCYTES NFR BLD: 30 %
MONOCYTES # BLD: 0.5 K/MCL (ref 0.3–0.9)
MONOCYTES NFR BLD: 10 %
NEUTROPHILS # BLD: 2.7 K/MCL (ref 1.8–7.7)
NEUTROPHILS NFR BLD: 59 %
TSH SERPL-ACNC: 2.11 MCUNITS/ML (ref 0.35–5)

## 2018-12-28 ENCOUNTER — OFFICE VISIT (OUTPATIENT)
Dept: PHYSICAL THERAPY | Age: 53
End: 2018-12-28
Attending: FAMILY MEDICINE
Payer: COMMERCIAL

## 2018-12-28 ENCOUNTER — OFFICE VISIT (OUTPATIENT)
Dept: INTERNAL MEDICINE | Age: 53
End: 2018-12-28

## 2018-12-28 DIAGNOSIS — N92.6 IRREGULAR MENSTRUAL CYCLE: ICD-10-CM

## 2018-12-28 DIAGNOSIS — R92.30 DENSE BREAST TISSUE ON MAMMOGRAM: ICD-10-CM

## 2018-12-28 DIAGNOSIS — Z00.00 ENCOUNTER FOR HEALTH MAINTENANCE EXAMINATION: ICD-10-CM

## 2018-12-28 DIAGNOSIS — M75.122 COMPLETE TEAR OF LEFT ROTATOR CUFF: ICD-10-CM

## 2018-12-28 DIAGNOSIS — Z12.31 VISIT FOR SCREENING MAMMOGRAM: ICD-10-CM

## 2018-12-28 DIAGNOSIS — E55.9 VITAMIN D DEFICIENCY: ICD-10-CM

## 2018-12-28 DIAGNOSIS — Z23 NEED FOR INFLUENZA VACCINATION: ICD-10-CM

## 2018-12-28 DIAGNOSIS — M75.02 ADHESIVE CAPSULITIS OF LEFT SHOULDER: Primary | ICD-10-CM

## 2018-12-28 DIAGNOSIS — I73.00 RAYNAUD'S DISEASE WITHOUT GANGRENE: ICD-10-CM

## 2018-12-28 PROCEDURE — 97110 THERAPEUTIC EXERCISES: CPT | Performed by: PHYSICAL THERAPIST

## 2018-12-28 PROCEDURE — 99396 PREV VISIT EST AGE 40-64: CPT | Performed by: INTERNAL MEDICINE

## 2018-12-28 RX ORDER — METHYLPREDNISOLONE ACETATE 40 MG/ML
40 INJECTION, SUSPENSION INTRA-ARTICULAR; INTRALESIONAL; INTRAMUSCULAR; SOFT TISSUE ONCE
Qty: 1 ML | Status: SHIPPED | COMMUNITY
Start: 2018-10-30

## 2018-12-28 RX ORDER — MULTIVIT,IRON,MINERALS/LUTEIN
TABLET ORAL
COMMUNITY

## 2018-12-28 ASSESSMENT — ENCOUNTER SYMPTOMS
CONSTITUTIONAL NEGATIVE: 1
HEMATOLOGIC/LYMPHATIC NEGATIVE: 1
PSYCHIATRIC NEGATIVE: 1
GASTROINTESTINAL NEGATIVE: 1
NEUROLOGICAL NEGATIVE: 1
ENDOCRINE NEGATIVE: 1
ALLERGIC/IMMUNOLOGIC NEGATIVE: 1
EYES NEGATIVE: 1
RESPIRATORY NEGATIVE: 1

## 2018-12-28 ASSESSMENT — PATIENT HEALTH QUESTIONNAIRE - PHQ9
SUM OF ALL RESPONSES TO PHQ9 QUESTIONS 1 AND 2: 0
2. FEELING DOWN, DEPRESSED OR HOPELESS: NOT AT ALL
1. LITTLE INTEREST OR PLEASURE IN DOING THINGS: NOT AT ALL

## 2018-12-28 ASSESSMENT — PAIN SCALES - GENERAL: PAINLEVEL: 1-2

## 2018-12-28 NOTE — PROGRESS NOTES
Dx: Complete tear of left rotator cuff (M75.122)  Adhesive capsulitis of left shoulder (M75.02)           Visit # 2  Fall Risk: standard     Scheduled Visits 8  Precautions: n/a   Insurance Authorized visits     10 PPO     Next MD visit: none scheduled  Ev

## 2019-01-02 ENCOUNTER — OFFICE VISIT (OUTPATIENT)
Dept: PHYSICAL THERAPY | Age: 54
End: 2019-01-02
Attending: FAMILY MEDICINE
Payer: COMMERCIAL

## 2019-01-02 PROCEDURE — 97110 THERAPEUTIC EXERCISES: CPT | Performed by: PHYSICAL THERAPIST

## 2019-01-02 NOTE — PROGRESS NOTES
Dx: Complete tear of left rotator cuff (M75.122)  Adhesive capsulitis of left shoulder (M75.02)           Visit # 3  Fall Risk: standard     Scheduled Visits 8  Precautions: n/a   Insurance Authorized visits     10 PPO     Next MD visit: none scheduled  Ev

## 2019-01-09 ENCOUNTER — OFFICE VISIT (OUTPATIENT)
Dept: PHYSICAL THERAPY | Age: 54
End: 2019-01-09
Attending: FAMILY MEDICINE
Payer: COMMERCIAL

## 2019-01-09 PROCEDURE — 97110 THERAPEUTIC EXERCISES: CPT | Performed by: PHYSICAL THERAPIST

## 2019-01-09 NOTE — PROGRESS NOTES
Dx: Complete tear of left rotator cuff (M75.122)  Adhesive capsulitis of left shoulder (M75.02)           Visit # 4  Fall Risk: standard     Scheduled Visits 8  Precautions: n/a   Insurance Authorized visits     10 PPO     Next MD visit: none scheduled  Ev min

## 2019-01-11 ENCOUNTER — OFFICE VISIT (OUTPATIENT)
Dept: PHYSICAL THERAPY | Age: 54
End: 2019-01-11
Attending: FAMILY MEDICINE
Payer: COMMERCIAL

## 2019-01-11 PROCEDURE — 97110 THERAPEUTIC EXERCISES: CPT | Performed by: PHYSICAL THERAPIST

## 2019-01-11 NOTE — PROGRESS NOTES
Dx: Complete tear of left rotator cuff (M75.122)  Adhesive capsulitis of left shoulder (M75.02)           Visit # 5  Fall Risk: standard     Scheduled Visits 8  Precautions: n/a   Insurance Authorized visits     10 PPO     Next MD visit: none scheduled  Ev

## 2019-01-16 ENCOUNTER — OFFICE VISIT (OUTPATIENT)
Dept: PHYSICAL THERAPY | Age: 54
End: 2019-01-16
Attending: FAMILY MEDICINE
Payer: COMMERCIAL

## 2019-01-16 PROCEDURE — 97110 THERAPEUTIC EXERCISES: CPT | Performed by: PHYSICAL THERAPIST

## 2019-01-16 NOTE — PROGRESS NOTES
Dx: Complete tear of left rotator cuff (M75.122)  Adhesive capsulitis of left shoulder (M75.02)           Visit # 6  Fall Risk: standard     Scheduled Visits 8  Precautions: n/a   Insurance Authorized visits     10 PPO     Next MD visit: none scheduled  Ev

## 2019-01-18 ENCOUNTER — OFFICE VISIT (OUTPATIENT)
Dept: PHYSICAL THERAPY | Age: 54
End: 2019-01-18
Attending: FAMILY MEDICINE
Payer: COMMERCIAL

## 2019-01-18 PROCEDURE — 97110 THERAPEUTIC EXERCISES: CPT | Performed by: PHYSICAL THERAPIST

## 2019-01-18 NOTE — PROGRESS NOTES
Dx: Complete tear of left rotator cuff (M75.122)  Adhesive capsulitis of left shoulder (M75.02)           Visit # 7  Fall Risk: standard     Scheduled Visits 8  Precautions: n/a   Insurance Authorized visits     10 PPO     Next MD visit: none scheduled  Ev put soup can into cabinets            Plan: Cont PT per plan of care    Charges: 3 therex       Total Timed Treatment: 38 min  Total Treatment Time: 38 min

## 2019-01-22 ENCOUNTER — HOSPITAL (OUTPATIENT)
Dept: OTHER | Age: 54
End: 2019-01-22
Attending: INTERNAL MEDICINE

## 2019-01-23 ENCOUNTER — OFFICE VISIT (OUTPATIENT)
Dept: PHYSICAL THERAPY | Age: 54
End: 2019-01-23
Attending: FAMILY MEDICINE
Payer: COMMERCIAL

## 2019-01-23 PROCEDURE — 97110 THERAPEUTIC EXERCISES: CPT | Performed by: PHYSICAL THERAPIST

## 2019-01-23 NOTE — PROGRESS NOTES
Patient Name: Carter Koch, : 1965, MRN: [id number]   Date:  2019  Referring Physician:  wily    Diagnosis: Complete tear of left rotator cuff (M75.122)  Adhesive capsulitis of left shoulder (M75.02)       Progress note    Pt possible to [phone]. If you have any questions, please contact me at Dept: [phone]. Sincerely,  Clarise Fabry PT, DPT, cert MDT      Electronically signed by therapist: Clarise Fabry, PT    Physician's certification required:  Yes

## 2019-01-29 ENCOUNTER — HOSPITAL (OUTPATIENT)
Dept: OTHER | Age: 54
End: 2019-01-29
Attending: INTERNAL MEDICINE

## 2019-01-30 ENCOUNTER — APPOINTMENT (OUTPATIENT)
Dept: PHYSICAL THERAPY | Age: 54
End: 2019-01-30
Attending: FAMILY MEDICINE
Payer: COMMERCIAL

## 2019-02-01 ENCOUNTER — APPOINTMENT (OUTPATIENT)
Dept: PHYSICAL THERAPY | Age: 54
End: 2019-02-01
Attending: FAMILY MEDICINE
Payer: COMMERCIAL

## 2019-02-05 NOTE — PROGRESS NOTES
Patient Name: Jennifer Valentin, : 1965, MRN: [id number]   Date:  2019  Referring Physician:  ty    Diagnosis: Complete tear of left rotator cuff (M75.122)  Adhesive capsulitis of left shoulder (M75.02)    Discharge note    Pt has

## 2019-02-06 ENCOUNTER — APPOINTMENT (OUTPATIENT)
Dept: PHYSICAL THERAPY | Age: 54
End: 2019-02-06
Attending: FAMILY MEDICINE
Payer: COMMERCIAL

## 2019-02-08 ENCOUNTER — APPOINTMENT (OUTPATIENT)
Dept: PHYSICAL THERAPY | Age: 54
End: 2019-02-08
Attending: FAMILY MEDICINE
Payer: COMMERCIAL

## 2019-02-13 ENCOUNTER — APPOINTMENT (OUTPATIENT)
Dept: PHYSICAL THERAPY | Age: 54
End: 2019-02-13
Attending: FAMILY MEDICINE
Payer: COMMERCIAL

## 2019-02-15 ENCOUNTER — APPOINTMENT (OUTPATIENT)
Dept: PHYSICAL THERAPY | Age: 54
End: 2019-02-15
Attending: FAMILY MEDICINE
Payer: COMMERCIAL

## 2019-04-29 ENCOUNTER — PATIENT OUTREACH (OUTPATIENT)
Dept: FAMILY MEDICINE CLINIC | Facility: CLINIC | Age: 54
End: 2019-04-29

## 2019-04-29 NOTE — PROGRESS NOTES
Please see if patient plans to be seen here. We have only seen once for acute visit. If so, please have her schedule a physical exam as she is due for a number of health maintenance items (including mammogram). Saw Dr. Princess Stewart.     Thanks,  Leydi Doan

## 2019-08-06 ENCOUNTER — HOSPITAL ENCOUNTER (OUTPATIENT)
Age: 54
Discharge: HOME | End: 2019-08-06
Payer: COMMERCIAL

## 2019-08-06 VITALS
OXYGEN SATURATION: 100 % | WEIGHT: 120 LBS | DIASTOLIC BLOOD PRESSURE: 56 MMHG | RESPIRATION RATE: 18 BRPM | HEART RATE: 76 BPM | SYSTOLIC BLOOD PRESSURE: 130 MMHG | TEMPERATURE: 98 F | BODY MASS INDEX: 20 KG/M2

## 2019-08-06 DIAGNOSIS — J02.0 STREPTOCOCCAL SORE THROAT: Primary | ICD-10-CM

## 2019-08-06 LAB — S PYO AG THROAT QL: POSITIVE

## 2019-08-06 PROCEDURE — 99214 OFFICE O/P EST MOD 30 MIN: CPT

## 2019-08-06 PROCEDURE — 87430 STREP A AG IA: CPT

## 2019-08-06 PROCEDURE — 99213 OFFICE O/P EST LOW 20 MIN: CPT

## 2019-08-06 RX ORDER — AZITHROMYCIN 500 MG/1
500 TABLET, FILM COATED ORAL DAILY
Qty: 5 TABLET | Refills: 0 | Status: SHIPPED | OUTPATIENT
Start: 2019-08-06 | End: 2019-08-11

## 2019-08-06 NOTE — ED PROVIDER NOTES
Patient presents with:  Sore Throat      HPI:     Carter Koch is a 62year old female who presents for evaluation of a chief complaint of sore throat, fatigue, chills, and body aches for the past 4 days. No difficulty swallowing.   Speech is frantz drinks        Types: 5 Standard drinks or equivalent per week        Comment: socially       Drug use: No      Sexual activity: Not on file    Lifestyle      Physical activity:        Days per week: Not on file        Minutes per session: Not on file conjunctiva clear  EARS: TM  bilateral: normal  NOSE: nasal turbinates: pink, normal mucosa  THROAT: redness noted, uvula midline and airway patent  LUNGS: clear to auscultation bilaterally; no rales, rhonchi, or wheezes    MDM/Assessment/Plan:   Orders fo

## 2019-08-07 ENCOUNTER — PATIENT MESSAGE (OUTPATIENT)
Dept: FAMILY MEDICINE CLINIC | Facility: CLINIC | Age: 54
End: 2019-08-07

## 2019-08-08 NOTE — TELEPHONE ENCOUNTER
From: Lorena Agosto  To: Francesca Credit,   Sent: 8/7/2019 9:26 PM CDT  Subject: Prescription Question    Hi Dr. Jaziel Shaver, this is Jimmye Spray. I'm writing with a request I'm not sure you can fill.  I was diagnosed with strep this week on T

## 2019-08-20 ENCOUNTER — TELEPHONE (OUTPATIENT)
Dept: FAMILY MEDICINE CLINIC | Facility: CLINIC | Age: 54
End: 2019-08-20

## 2019-08-20 ENCOUNTER — OFFICE VISIT (OUTPATIENT)
Dept: FAMILY MEDICINE CLINIC | Facility: CLINIC | Age: 54
End: 2019-08-20
Payer: COMMERCIAL

## 2019-08-20 VITALS
RESPIRATION RATE: 16 BRPM | TEMPERATURE: 98 F | SYSTOLIC BLOOD PRESSURE: 118 MMHG | HEART RATE: 74 BPM | DIASTOLIC BLOOD PRESSURE: 60 MMHG | HEIGHT: 65 IN | WEIGHT: 131 LBS | BODY MASS INDEX: 21.83 KG/M2

## 2019-08-20 DIAGNOSIS — J02.9 SORE THROAT: Primary | ICD-10-CM

## 2019-08-20 PROBLEM — R92.30 DENSE BREAST TISSUE ON MAMMOGRAM: Status: ACTIVE | Noted: 2017-07-15

## 2019-08-20 PROBLEM — R92.2 DENSE BREAST TISSUE ON MAMMOGRAM: Status: ACTIVE | Noted: 2017-07-15

## 2019-08-20 LAB
CONTROL LINE PRESENT WITH A CLEAR BACKGROUND (YES/NO): YES YES/NO
KIT LOT #: NORMAL NUMERIC
STREP GRP A CUL-SCR: NEGATIVE

## 2019-08-20 PROCEDURE — 87081 CULTURE SCREEN ONLY: CPT | Performed by: FAMILY MEDICINE

## 2019-08-20 PROCEDURE — 87880 STREP A ASSAY W/OPTIC: CPT | Performed by: FAMILY MEDICINE

## 2019-08-20 PROCEDURE — 99213 OFFICE O/P EST LOW 20 MIN: CPT | Performed by: FAMILY MEDICINE

## 2019-08-20 NOTE — PROGRESS NOTES
Chief Complaint:  Patient presents with:  Urgent Care F/u: On 08- was Dx:Strep, states sore throat is still current    HPI:  This is a 62year old female patient presenting for Urgent Care F/u (On 08- was Dx:Strep, states sore throat is still Medications:  Cholecalciferol (VITAMIN D) 1000 units Oral Tab Take 1,000 Units by mouth. Disp:  Rfl:    Multiple Vitamins-Minerals (CENTRUM SILVER ULTRA WOMENS) Oral Tab Take by mouth.  Disp:  Rfl:    Acetaminophen (TYLENOL OR)  Disp:  Rfl:      Allergies:

## 2019-08-20 NOTE — TELEPHONE ENCOUNTER
Patient completed medical records release form to obtain records from previous provider, [facility].  Release faxed to [facility] at [phone]

## 2019-09-24 ENCOUNTER — OFFICE VISIT (OUTPATIENT)
Dept: FAMILY MEDICINE CLINIC | Facility: CLINIC | Age: 54
End: 2019-09-24
Payer: COMMERCIAL

## 2019-09-24 VITALS
TEMPERATURE: 98 F | RESPIRATION RATE: 14 BRPM | SYSTOLIC BLOOD PRESSURE: 119 MMHG | HEART RATE: 72 BPM | HEIGHT: 65 IN | BODY MASS INDEX: 21.49 KG/M2 | WEIGHT: 129 LBS | DIASTOLIC BLOOD PRESSURE: 70 MMHG

## 2019-09-24 DIAGNOSIS — J02.9 SORE THROAT: Primary | ICD-10-CM

## 2019-09-24 PROCEDURE — 87081 CULTURE SCREEN ONLY: CPT | Performed by: PHYSICIAN ASSISTANT

## 2019-09-24 PROCEDURE — 99213 OFFICE O/P EST LOW 20 MIN: CPT | Performed by: PHYSICIAN ASSISTANT

## 2019-09-24 PROCEDURE — 87880 STREP A ASSAY W/OPTIC: CPT | Performed by: PHYSICIAN ASSISTANT

## 2019-09-25 NOTE — PROGRESS NOTES
Patient presents with:  Sore Throat: possible strep        HISTORY OF PRESENT ILLNESS  Cecille Sheldon is a 62year old female who presents for evaluation of sore throat. For the last few days, the left side of her throat has been painful.  She has a bilaterally. No adventitious breath sounds appreciated.       Labs:   Office Visit on 09/24/2019   Component Date Value Ref Range Status   • Strep Grp A Screen 09/24/2019 Negative  Negative Final   • Control Line Present with a clear * 09/24/2019 Yes  Yes/N

## 2019-10-01 NOTE — TELEPHONE ENCOUNTER
Received medical records from [facility], placed in Dr. Phuc Guerra in box

## 2020-01-13 ENCOUNTER — TELEPHONE (OUTPATIENT)
Dept: FAMILY MEDICINE CLINIC | Facility: CLINIC | Age: 55
End: 2020-01-13

## 2020-01-13 DIAGNOSIS — Z00.00 LABORATORY EXAM ORDERED AS PART OF ROUTINE GENERAL MEDICAL EXAMINATION: Primary | ICD-10-CM

## 2020-01-13 NOTE — TELEPHONE ENCOUNTER
Please order CMP, CBC, TSH, vit D, and lipid under Dr. Mignon Crystal for physical.    Calvin Carpenter

## 2020-01-13 NOTE — TELEPHONE ENCOUNTER
Please enter lab orders for the patient's upcoming physical appointment. Physical scheduled:    Your appointments     Date & Time Appointment Department [facility])    Jan 21, 2020  2:30 PM CST Physical - Established with Adela Ward DO [address] [place] [address]

## 2020-01-16 LAB
ABSOLUTE BASOPHILS: 70 CELLS/UL (ref 0–200)
ABSOLUTE EOSINOPHILS: 40 CELLS/UL (ref 15–500)
ABSOLUTE LYMPHOCYTES: 1305 CELLS/UL (ref 850–3900)
ABSOLUTE MONOCYTES: 495 CELLS/UL (ref 200–950)
ABSOLUTE NEUTROPHILS: 3090 CELLS/UL (ref 1500–7800)
ALBUMIN/GLOBULIN RATIO: 1.9 (CALC) (ref 1–2.5)
ALBUMIN: 4.5 G/DL (ref 3.6–5.1)
ALKALINE PHOSPHATASE: 50 U/L (ref 33–130)
ALT: 18 U/L (ref 6–29)
AST: 23 U/L (ref 10–35)
BASOPHILS: 1.4 %
BILIRUBIN, TOTAL: 0.6 MG/DL (ref 0.2–1.2)
BUN: 10 MG/DL (ref 7–25)
CALCIUM: 9.3 MG/DL (ref 8.6–10.4)
CARBON DIOXIDE: 29 MMOL/L (ref 20–32)
CHLORIDE: 101 MMOL/L (ref 98–110)
CHOL/HDLC RATIO: 1.8 (CALC)
CHOLESTEROL, TOTAL: 202 MG/DL
CREATININE: 0.8 MG/DL (ref 0.5–1.05)
EGFR IF AFRICN AM: 96 ML/MIN/1.73M2
EGFR IF NONAFRICN AM: 82 ML/MIN/1.73M2
EOSINOPHILS: 0.8 %
GLOBULIN: 2.4 G/DL (CALC) (ref 1.9–3.7)
GLUCOSE: 82 MG/DL (ref 65–99)
HDL CHOLESTEROL: 114 MG/DL
HEMATOCRIT: 39.8 % (ref 35–45)
HEMOGLOBIN: 13.7 G/DL (ref 11.7–15.5)
LDL-CHOLESTEROL: 75 MG/DL (CALC)
LYMPHOCYTES: 26.1 %
MCH: 32.3 PG (ref 27–33)
MCHC: 34.4 G/DL (ref 32–36)
MCV: 93.9 FL (ref 80–100)
MONOCYTES: 9.9 %
MPV: 11.7 FL (ref 7.5–12.5)
NEUTROPHILS: 61.8 %
NON-HDL CHOLESTEROL: 88 MG/DL (CALC)
PLATELET COUNT: 257 THOUSAND/UL (ref 140–400)
POTASSIUM: 4.5 MMOL/L (ref 3.5–5.3)
PROTEIN, TOTAL: 6.9 G/DL (ref 6.1–8.1)
RDW: 11.8 % (ref 11–15)
RED BLOOD CELL COUNT: 4.24 MILLION/UL (ref 3.8–5.1)
SODIUM: 139 MMOL/L (ref 135–146)
TRIGLYCERIDES: 54 MG/DL
TSH W/REFLEX TO FT4: 2.04 MIU/L (ref 0.4–4.5)
VITAMIN D, 25-OH, TOTAL: 36 NG/ML (ref 30–100)
WHITE BLOOD CELL COUNT: 5 THOUSAND/UL (ref 3.8–10.8)

## 2020-01-21 ENCOUNTER — OFFICE VISIT (OUTPATIENT)
Dept: FAMILY MEDICINE CLINIC | Facility: CLINIC | Age: 55
End: 2020-01-21
Payer: COMMERCIAL

## 2020-01-21 VITALS
DIASTOLIC BLOOD PRESSURE: 60 MMHG | TEMPERATURE: 98 F | HEART RATE: 70 BPM | HEIGHT: 65 IN | RESPIRATION RATE: 16 BRPM | SYSTOLIC BLOOD PRESSURE: 100 MMHG | BODY MASS INDEX: 21.83 KG/M2 | WEIGHT: 131 LBS

## 2020-01-21 DIAGNOSIS — Z01.419 WELL WOMAN EXAM WITH ROUTINE GYNECOLOGICAL EXAM: Primary | ICD-10-CM

## 2020-01-21 DIAGNOSIS — Z11.59 ENCOUNTER FOR HCV SCREENING TEST FOR LOW RISK PATIENT: ICD-10-CM

## 2020-01-21 DIAGNOSIS — R92.2 INCONCLUSIVE MAMMOGRAM DUE TO DENSE BREASTS: ICD-10-CM

## 2020-01-21 DIAGNOSIS — Z12.4 SCREENING FOR CERVICAL CANCER: ICD-10-CM

## 2020-01-21 DIAGNOSIS — Z12.31 VISIT FOR SCREENING MAMMOGRAM: ICD-10-CM

## 2020-01-21 PROCEDURE — 99396 PREV VISIT EST AGE 40-64: CPT | Performed by: FAMILY MEDICINE

## 2020-01-21 NOTE — PROGRESS NOTES
SUBJECTIVE:  Patient presents with:  Physical: WWE w pap  Orders Call: Due for Mammogram, Colonoscopy was completed 4 years ago at [facility]    HPI:  Notes a mole in the genital area. Noticed about a month ago.     Health Maintenance:  Vaccines: reviewed a Problem Relation Age of Onset   • Diabetes Father    • Hypertension Father    • Hypertension Mother    • Heart Disorder Mother         rheumatic fever   • Thyroid Disorder Sister    • Arthritis Sister    • No Known Problems Brother    • No Known Problems Call (Due for Mammogram, Colonoscopy was completed 4 years ago at [facility])    Problem List Items Addressed This Visit     None      Visit Diagnoses     Well woman exam with routine gynecological exam    -  Primary    Screening for cervical cancer        R

## 2020-01-24 LAB — HPV MRNA E6/E7: NOT DETECTED

## 2020-02-18 ENCOUNTER — HOSPITAL ENCOUNTER (OUTPATIENT)
Dept: MAMMOGRAPHY | Age: 55
Discharge: HOME | End: 2020-02-18
Attending: FAMILY MEDICINE
Payer: COMMERCIAL

## 2020-02-18 DIAGNOSIS — Z12.31 VISIT FOR SCREENING MAMMOGRAM: ICD-10-CM

## 2020-02-18 PROCEDURE — 77063 BREAST TOMOSYNTHESIS BI: CPT | Performed by: FAMILY MEDICINE

## 2020-02-18 PROCEDURE — 77067 SCR MAMMO BI INCL CAD: CPT | Performed by: FAMILY MEDICINE

## 2020-03-10 ENCOUNTER — HOSPITAL ENCOUNTER (OUTPATIENT)
Dept: ULTRASOUND IMAGING | Facility: HOSPITAL | Age: 55
Discharge: HOME | End: 2020-03-10
Attending: FAMILY MEDICINE
Payer: COMMERCIAL

## 2020-03-10 ENCOUNTER — HOSPITAL ENCOUNTER (OUTPATIENT)
Dept: MAMMOGRAPHY | Facility: HOSPITAL | Age: 55
Discharge: HOME | End: 2020-03-10
Attending: FAMILY MEDICINE
Payer: COMMERCIAL

## 2020-03-10 DIAGNOSIS — R92.8 ABNORMAL MAMMOGRAM: ICD-10-CM

## 2020-03-10 PROCEDURE — 77065 DX MAMMO INCL CAD UNI: CPT | Performed by: FAMILY MEDICINE

## 2020-03-10 PROCEDURE — 77061 BREAST TOMOSYNTHESIS UNI: CPT | Performed by: FAMILY MEDICINE

## 2020-03-10 PROCEDURE — 76642 ULTRASOUND BREAST LIMITED: CPT | Performed by: FAMILY MEDICINE

## 2020-03-12 ENCOUNTER — HOSPITAL ENCOUNTER (OUTPATIENT)
Age: 55
Discharge: HOME | End: 2020-03-12
Payer: COMMERCIAL

## 2020-03-12 VITALS
BODY MASS INDEX: 21.66 KG/M2 | SYSTOLIC BLOOD PRESSURE: 151 MMHG | WEIGHT: 130 LBS | OXYGEN SATURATION: 100 % | DIASTOLIC BLOOD PRESSURE: 69 MMHG | HEART RATE: 85 BPM | TEMPERATURE: 98 F | HEIGHT: 65 IN | RESPIRATION RATE: 16 BRPM

## 2020-03-12 DIAGNOSIS — J02.0 STREPTOCOCCAL SORE THROAT: Primary | ICD-10-CM

## 2020-03-12 LAB — S PYO AG THROAT QL: POSITIVE

## 2020-03-12 PROCEDURE — 87430 STREP A AG IA: CPT

## 2020-03-12 PROCEDURE — 99214 OFFICE O/P EST MOD 30 MIN: CPT

## 2020-03-12 PROCEDURE — 99213 OFFICE O/P EST LOW 20 MIN: CPT

## 2020-03-12 RX ORDER — AZITHROMYCIN 500 MG/1
500 TABLET, FILM COATED ORAL DAILY
Qty: 5 TABLET | Refills: 0 | Status: SHIPPED | OUTPATIENT
Start: 2020-03-12 | End: 2020-03-17

## 2020-03-12 NOTE — ED PROVIDER NOTES
Patient presents with:  Sore Throat      HPI:     Allyson George is a 62year old female who presents for evaluation of a chief complaint of sore throat for the past 2 days. She states she has been mildly fatigued and had body aches.   She has not drinks or equivalent per week        Comment: socially       Drug use: No      Sexual activity: Not on file    Lifestyle      Physical activity:        Days per week: Not on file        Minutes per session: Not on file      Stress: Not on file    Relations clear  EARS: TM  bilateral: normal  NOSE: nasal turbinates: pink, normal mucosa  THROAT: redness noted, uvula midline and airway patent  LUNGS: clear to auscultation bilaterally; no rales, rhonchi, or wheezes    MDM/Assessment/Plan:   Orders for this encou

## 2020-03-15 ENCOUNTER — PATIENT MESSAGE (OUTPATIENT)
Dept: FAMILY MEDICINE CLINIC | Facility: CLINIC | Age: 55
End: 2020-03-15

## 2020-03-15 DIAGNOSIS — J02.0 STREP THROAT: Primary | ICD-10-CM

## 2020-03-16 NOTE — TELEPHONE ENCOUNTER
From: Lorena Agosto  To: Juliana Carolina DO  Sent: 3/15/2020 2:46 PM CDT  Subject: Visit Raffaele Boothe Dr., I tested + for strep on Th and am on antibiotics.  I alternate btw feeling better and then blah and still have low grade fever (cu

## 2020-03-17 RX ORDER — AZITHROMYCIN 250 MG/1
TABLET, FILM COATED ORAL
Qty: 6 TABLET | Refills: 0 | Status: SHIPPED | OUTPATIENT
Start: 2020-03-17 | End: 2020-06-03 | Stop reason: ALTCHOICE

## 2020-04-28 ENCOUNTER — E-VISIT (OUTPATIENT)
Dept: FAMILY MEDICINE CLINIC | Facility: CLINIC | Age: 55
End: 2020-04-28

## 2020-04-28 ENCOUNTER — PATIENT MESSAGE (OUTPATIENT)
Dept: FAMILY MEDICINE CLINIC | Facility: CLINIC | Age: 55
End: 2020-04-28

## 2020-04-28 DIAGNOSIS — Z02.9 ENCOUNTERS FOR ADMINISTRATIVE PURPOSE: Primary | ICD-10-CM

## 2020-04-29 ENCOUNTER — VIRTUAL PHONE E/M (OUTPATIENT)
Dept: FAMILY MEDICINE CLINIC | Facility: CLINIC | Age: 55
End: 2020-04-29
Payer: COMMERCIAL

## 2020-04-29 DIAGNOSIS — R50.9 FEVER WITH SORE THROAT: Primary | ICD-10-CM

## 2020-04-29 DIAGNOSIS — J02.9 FEVER WITH SORE THROAT: Primary | ICD-10-CM

## 2020-04-29 PROCEDURE — 99213 OFFICE O/P EST LOW 20 MIN: CPT | Performed by: FAMILY MEDICINE

## 2020-04-29 RX ORDER — CLINDAMYCIN HYDROCHLORIDE 300 MG/1
300 CAPSULE ORAL 3 TIMES DAILY
Qty: 30 CAPSULE | Refills: 0 | Status: SHIPPED | OUTPATIENT
Start: 2020-04-29 | End: 2020-06-03 | Stop reason: ALTCHOICE

## 2020-04-29 NOTE — PROGRESS NOTES
Virtual Telephone Check-In    [address] verbally consents to a Virtual/Telephone Check-In visit on 04/29/20.     Patient understands and accepts financial responsibility for any deductible, co-insurance and/or co-pays associated with this servi

## 2020-04-29 NOTE — TELEPHONE ENCOUNTER
From: Lorena Agosto  To: Krystian Lawler DO  Sent: 4/28/2020 4:10 PM CDT  Subject: Non-Urgent Mati Vizcarra Dr., looking for advice. .. as you know, I went through 2 rounds of Azyth.  for strep recently and am prone to strep often in my past

## 2020-05-07 ENCOUNTER — PATIENT MESSAGE (OUTPATIENT)
Dept: FAMILY MEDICINE CLINIC | Facility: CLINIC | Age: 55
End: 2020-05-07

## 2020-05-07 ENCOUNTER — TELEPHONE (OUTPATIENT)
Dept: FAMILY MEDICINE CLINIC | Facility: CLINIC | Age: 55
End: 2020-05-07

## 2020-05-07 ENCOUNTER — VIRTUAL PHONE E/M (OUTPATIENT)
Dept: FAMILY MEDICINE CLINIC | Facility: CLINIC | Age: 55
End: 2020-05-07
Payer: COMMERCIAL

## 2020-05-07 DIAGNOSIS — R21 RASH AND NONSPECIFIC SKIN ERUPTION: Primary | ICD-10-CM

## 2020-05-07 PROCEDURE — 99213 OFFICE O/P EST LOW 20 MIN: CPT | Performed by: FAMILY MEDICINE

## 2020-05-07 NOTE — TELEPHONE ENCOUNTER
Virtual Check-In    Lorena Agosto verbally consents to a [employer] on 05/07/20. Patient understands and accepts financial responsibility for any deductible, co-insurance and/or co-pays associated with this service.     Duration of t

## 2020-05-07 NOTE — TELEPHONE ENCOUNTER
From: Lorena Agosto  To: Mortimer Lapidus, DO  Sent: 5/7/2020 8:08 AM CDT  Subject: Non-Urgent Medical Question    Good morning Dr. Merle Case. .. question. ..  I'm on day 8 of clindamycin and seem to be tolerating well, but noticed a light rash on my torso

## 2020-06-03 ENCOUNTER — OFFICE VISIT (OUTPATIENT)
Dept: FAMILY MEDICINE CLINIC | Facility: CLINIC | Age: 55
End: 2020-06-03
Payer: COMMERCIAL

## 2020-06-03 VITALS
DIASTOLIC BLOOD PRESSURE: 80 MMHG | TEMPERATURE: 98 F | RESPIRATION RATE: 16 BRPM | SYSTOLIC BLOOD PRESSURE: 112 MMHG | HEART RATE: 76 BPM

## 2020-06-03 DIAGNOSIS — J02.9 PHARYNGITIS, UNSPECIFIED ETIOLOGY: Primary | ICD-10-CM

## 2020-06-03 PROCEDURE — 99213 OFFICE O/P EST LOW 20 MIN: CPT | Performed by: FAMILY MEDICINE

## 2020-06-03 PROCEDURE — 87880 STREP A ASSAY W/OPTIC: CPT | Performed by: FAMILY MEDICINE

## 2020-06-03 RX ORDER — CLARITHROMYCIN 250 MG/1
250 TABLET, FILM COATED ORAL 2 TIMES DAILY
Qty: 20 TABLET | Refills: 0 | Status: SHIPPED | OUTPATIENT
Start: 2020-06-03 | End: 2020-06-13

## 2020-06-03 NOTE — PROGRESS NOTES
Patient presents with:  Sore Throat: started to feel sick agian last week     HPI:   Carter Koch is a 60year old female who presents to the office for recurrent pharyngitis.   Talked to PCP - she has a h/o chronic recurrent strep and has seen ID treating with them)  Recently azithromycin and clindamycin. Will try clarithromycin BID x 10 days  Rapid negative  Strep culture sent as well  - STREP A ASSAY W/OPTIC  - GRP A STREP CULT, THROAT  - clarithromycin 250 MG Oral Tab;  Take 1 tablet (250 mg tot

## 2020-06-04 ENCOUNTER — PATIENT MESSAGE (OUTPATIENT)
Dept: FAMILY MEDICINE CLINIC | Facility: CLINIC | Age: 55
End: 2020-06-04

## 2020-06-04 DIAGNOSIS — J02.9 PHARYNGITIS, UNSPECIFIED ETIOLOGY: ICD-10-CM

## 2020-06-04 DIAGNOSIS — J02.9 FEVER WITH SORE THROAT: Primary | ICD-10-CM

## 2020-06-04 DIAGNOSIS — R50.9 FEVER WITH SORE THROAT: Primary | ICD-10-CM

## 2020-06-05 DIAGNOSIS — R50.9 FEVER, UNSPECIFIED FEVER CAUSE: Primary | ICD-10-CM

## 2020-06-05 DIAGNOSIS — R59.0 CERVICAL LYMPHADENOPATHY: ICD-10-CM

## 2020-06-05 DIAGNOSIS — J02.9 PHARYNGITIS, UNSPECIFIED ETIOLOGY: ICD-10-CM

## 2020-06-06 ENCOUNTER — LAB ENCOUNTER (OUTPATIENT)
Dept: LAB | Facility: HOSPITAL | Age: 55
End: 2020-06-06
Attending: FAMILY MEDICINE
Payer: COMMERCIAL

## 2020-06-06 DIAGNOSIS — R59.0 CERVICAL LYMPHADENOPATHY: ICD-10-CM

## 2020-06-06 DIAGNOSIS — R50.9 FEVER, UNSPECIFIED FEVER CAUSE: ICD-10-CM

## 2020-06-06 DIAGNOSIS — J02.9 PHARYNGITIS, UNSPECIFIED ETIOLOGY: ICD-10-CM

## 2020-06-08 NOTE — TELEPHONE ENCOUNTER
From: Lorena Agosto  To: Renetta Clark MD  Sent: 6/4/2020 2:17 PM CDT  Subject: Visit Follow-up Question    Hi Dr. Nitesh Silva. Thank you for your time yesterday. Question. .. if the lab comes back negative for strep, do you think it might be a good

## 2020-06-09 ENCOUNTER — LAB ENCOUNTER (OUTPATIENT)
Dept: LAB | Facility: HOSPITAL | Age: 55
End: 2020-06-09
Attending: FAMILY MEDICINE
Payer: COMMERCIAL

## 2020-06-09 DIAGNOSIS — J02.9 PHARYNGITIS, UNSPECIFIED ETIOLOGY: ICD-10-CM

## 2020-06-09 DIAGNOSIS — R50.9 FEVER WITH SORE THROAT: ICD-10-CM

## 2020-06-09 DIAGNOSIS — J02.9 FEVER WITH SORE THROAT: ICD-10-CM

## 2020-12-08 ENCOUNTER — TELEPHONE (OUTPATIENT)
Dept: FAMILY MEDICINE CLINIC | Facility: CLINIC | Age: 55
End: 2020-12-08

## 2020-12-08 ENCOUNTER — TELEMEDICINE (OUTPATIENT)
Dept: FAMILY MEDICINE CLINIC | Facility: CLINIC | Age: 55
End: 2020-12-08
Payer: COMMERCIAL

## 2020-12-08 DIAGNOSIS — R50.9 FEVER, UNSPECIFIED: Primary | ICD-10-CM

## 2020-12-08 PROCEDURE — 99213 OFFICE O/P EST LOW 20 MIN: CPT | Performed by: PHYSICIAN ASSISTANT

## 2020-12-08 NOTE — PROGRESS NOTES
Virtual Check-In    Lorena Agosto verbally consents to a [employer] on 12/08/20. Patient understands and accepts financial responsibility for any deductible, co-insurance and/or co-pays associated with this service.     Duration of t

## 2020-12-09 ENCOUNTER — LAB ENCOUNTER (OUTPATIENT)
Dept: LAB | Age: 55
End: 2020-12-09
Attending: PHYSICIAN ASSISTANT
Payer: COMMERCIAL

## 2020-12-09 DIAGNOSIS — R50.9 FEVER, UNSPECIFIED: ICD-10-CM

## 2020-12-19 ENCOUNTER — TELEMEDICINE (OUTPATIENT)
Dept: TELEHEALTH | Age: 55
End: 2020-12-19

## 2020-12-19 DIAGNOSIS — Z20.822 SUSPECTED COVID-19 VIRUS INFECTION: Primary | ICD-10-CM

## 2020-12-19 PROCEDURE — 99212 OFFICE O/P EST SF 10 MIN: CPT | Performed by: NURSE PRACTITIONER

## 2020-12-19 NOTE — PATIENT INSTRUCTIONS
Quarantine Advice:    I have submitted an order for coronavirus testing for you. Someone from [address] [place] [address] will be contacting you Monday to set up an appointment.  If you do not hear from a  by Monday afternoon, you may will help the healthcare   provider's office take necessary precautions to keep other people from   getting infected or exposed.   Wear a facemask  You should wear a facemask when you are around other people (e.g., sharing   a room or vehicle) or pets and b body fluids on them. Use   a household cleaning spray or wipe, according to the label instructions.    Labels contain instructions for safe and effective use of the cleaning   product including precautions you should take when applying the product,   such a your symptoms get worse, call your   healthcare provider immediately. Get rest and stay hydrated. If you have a medical appointment, call the healthcare provider ahead of   time and tell them that you have or may have COVID-19.   For medical emergencies, the steps below to help prevent the disease from spreading to people in your home and community. Stay home except to get medical care  You should restrict activities outside your home except for getting medical care.  Do not go to work, school, or public a should be washed thoroughly with soap and water. Clean your hands often  Wash your hands often with soap and water for at least 20 seconds.  If soap and water are not available, clean your hands with an alcohol-based hand  that contains at least 6 services personnel arrive. Discontinuing home isolation  Patients with confirmed COVID-19 should remain under home isolation precautions until the risk of secondary transmission to others is thought to be low.  The decision to discontinue home isolation pr (CDC)  10 things you can do to manage your health at home, [email or web address]. pdf

## 2020-12-19 NOTE — PROGRESS NOTES
Simi Torres is a 60year old female. HPI:   Patient presents with: Other: Requesting Covid test    Encounter was conducted by video visit. Video encounter was approximately 10 minutes.   Patient reports low grade fever off an on, sore throat an MUSCULOSKELETAL: no arthralgia or swollen joints  LYMPH:  Denies lymphadenopathy  NEURO: Denies headaches or lightheadedness      EXAM:   There were no vitals taken for this visit. GENERAL: well developed, well nourished, in no apparent distress.  Maggy except for getting   medical care. Do not go to work, school, or public areas. Avoid using   public transportation, ride-sharing, or taxis.   Separate yourself from other people and animals in your home  People: As much as possible you should stay in a spec and water are not available, clean your hands with an alcohol-based hand    that contains at least 60% alcohol, covering all surfaces of   your hands and rubbing them together until they feel dry.  Soap and water   should be used preferentially i under home isolation   precautions until the risk of secondary transmission to others is thought   to be low.  The decision to discontinue home isolation precautions should   be made on a case-by-case basis, in consultation with healthcare providers   and s [employer]. pdf    Those who have tested positive for COVID should observe a 10-day quarantine period starting the day your symptoms began.  After your 10 day quarantine, you may return to activity and work when you cough or sneeze.  Throw used tissues in a lined trash can; immediately wash your hands with soap and water for at least 20 seconds or clean your hands with an alcohol-based hand  that contains at least 60%-95% alcohol, covering all surface the healthcare provider's office to keep other people in the office or waiting room from getting infected or exposed. Ask your healthcare provider to call the local or state health department.  Persons who are placed under active monitoring or facilitated available. If you need to be around other people or outside of the home, wear a facemask. Avoid sharing personal items with other people in your household, like dishes, towels, and bedding.   Clean all surfaces that are touched often, like counters, tablet

## 2020-12-21 ENCOUNTER — LAB ENCOUNTER (OUTPATIENT)
Dept: LAB | Age: 55
End: 2020-12-21
Attending: NURSE PRACTITIONER
Payer: COMMERCIAL

## 2020-12-21 DIAGNOSIS — Z20.822 SUSPECTED COVID-19 VIRUS INFECTION: ICD-10-CM

## 2021-03-11 ENCOUNTER — HOSPITAL ENCOUNTER (OUTPATIENT)
Age: 56
Discharge: HOME | End: 2021-03-11
Payer: COMMERCIAL

## 2021-03-11 VITALS
HEART RATE: 89 BPM | SYSTOLIC BLOOD PRESSURE: 160 MMHG | RESPIRATION RATE: 20 BRPM | TEMPERATURE: 98 F | OXYGEN SATURATION: 98 % | DIASTOLIC BLOOD PRESSURE: 76 MMHG

## 2021-03-11 DIAGNOSIS — J02.9 VIRAL PHARYNGITIS: Primary | ICD-10-CM

## 2021-03-11 LAB
S PYO AG THROAT QL: NEGATIVE
SARS-COV-2 RNA RESP QL NAA+PROBE: NOT DETECTED

## 2021-03-11 PROCEDURE — 87880 STREP A ASSAY W/OPTIC: CPT

## 2021-03-11 PROCEDURE — 99213 OFFICE O/P EST LOW 20 MIN: CPT

## 2021-03-11 PROCEDURE — 99212 OFFICE O/P EST SF 10 MIN: CPT

## 2021-03-12 NOTE — ED PROVIDER NOTES
Patient presents with:  Sore Throat: Throat, I have recurring strep, feels like that, hoping to be tested - Entered by patient      HPI:     Samuel Pascual is a 60year old female who presents for evaluation of a chief complaint of sore throat, mil Topics      Concerns:         Service: Not Asked        Blood Transfusions: Not Asked        Caffeine Concern: Yes          1 coffee daily        Occupational Exposure: Not Asked        Hobby Hazards: Not Asked        Sleep Concern: Not Asked normocephalic, atraumatic  EYES: sclera non icteric bilateral, conjunctiva clear  EARS: TM  bilateral: normal  NOSE: nasal turbinates: pink, normal mucosa  THROAT: redness noted, uvula midline and airway patent  LUNGS: clear to auscultation bilaterally; no

## 2021-03-12 NOTE — ED INITIAL ASSESSMENT (HPI)
Mild headache, mild throat tightness, fatigued, low grade fever since Sunday, denies sick exposure to covid, h/o strep

## 2021-04-01 DIAGNOSIS — Z23 NEED FOR VACCINATION: ICD-10-CM

## 2021-04-03 ENCOUNTER — IMMUNIZATION (OUTPATIENT)
Dept: LAB | Age: 56
End: 2021-04-03
Attending: HOSPITALIST
Payer: COMMERCIAL

## 2021-04-03 DIAGNOSIS — Z23 NEED FOR VACCINATION: Primary | ICD-10-CM

## 2021-04-03 PROCEDURE — 0001A SARSCOV2 VAC 30MCG/0.3ML IM: CPT

## 2021-04-24 ENCOUNTER — IMMUNIZATION (OUTPATIENT)
Dept: LAB | Age: 56
End: 2021-04-24
Attending: HOSPITALIST
Payer: COMMERCIAL

## 2021-04-24 DIAGNOSIS — Z23 NEED FOR VACCINATION: Primary | ICD-10-CM

## 2021-04-24 PROCEDURE — 0002A SARSCOV2 VAC 30MCG/0.3ML IM: CPT

## 2021-05-26 ENCOUNTER — TELEPHONE (OUTPATIENT)
Dept: FAMILY MEDICINE CLINIC | Facility: CLINIC | Age: 56
End: 2021-05-26

## 2021-05-26 VITALS
TEMPERATURE: 98 F | SYSTOLIC BLOOD PRESSURE: 110 MMHG | HEART RATE: 60 BPM | RESPIRATION RATE: 16 BRPM | WEIGHT: 125 LBS | DIASTOLIC BLOOD PRESSURE: 62 MMHG | OXYGEN SATURATION: 99 % | BODY MASS INDEX: 20.8 KG/M2

## 2021-05-26 DIAGNOSIS — Z00.00 ROUTINE GENERAL MEDICAL EXAMINATION AT A HEALTH CARE FACILITY: Primary | ICD-10-CM

## 2021-05-26 NOTE — TELEPHONE ENCOUNTER
Please enter lab orders for the patient's upcoming physical appointment. Physical scheduled:    Your appointments     Date & Time Appointment Department [facility])    Jun 01, 2021 10:20 AM CDT Adult Physical with Lashay House  [facility], R

## 2021-06-01 ENCOUNTER — TELEPHONE (OUTPATIENT)
Dept: FAMILY MEDICINE CLINIC | Facility: CLINIC | Age: 56
End: 2021-06-01

## 2021-06-01 DIAGNOSIS — Z12.31 VISIT FOR SCREENING MAMMOGRAM: Primary | ICD-10-CM

## 2021-06-25 ENCOUNTER — OFFICE VISIT (OUTPATIENT)
Dept: FAMILY MEDICINE CLINIC | Facility: CLINIC | Age: 56
End: 2021-06-25
Payer: COMMERCIAL

## 2021-06-25 VITALS
RESPIRATION RATE: 16 BRPM | BODY MASS INDEX: 22.33 KG/M2 | WEIGHT: 134 LBS | TEMPERATURE: 97 F | HEIGHT: 65 IN | DIASTOLIC BLOOD PRESSURE: 70 MMHG | SYSTOLIC BLOOD PRESSURE: 110 MMHG | HEART RATE: 78 BPM

## 2021-06-25 DIAGNOSIS — Z00.00 WELL WOMAN EXAM WITHOUT GYNECOLOGICAL EXAM: Primary | ICD-10-CM

## 2021-06-25 PROCEDURE — 99396 PREV VISIT EST AGE 40-64: CPT | Performed by: FAMILY MEDICINE

## 2021-06-25 PROCEDURE — 3074F SYST BP LT 130 MM HG: CPT | Performed by: FAMILY MEDICINE

## 2021-06-25 PROCEDURE — 3078F DIAST BP <80 MM HG: CPT | Performed by: FAMILY MEDICINE

## 2021-06-25 PROCEDURE — 3008F BODY MASS INDEX DOCD: CPT | Performed by: FAMILY MEDICINE

## 2021-06-25 NOTE — PROGRESS NOTES
SUBJECTIVE:  Patient presents with:  Physical: WWE no pap, Had Colonoscopy at [facility] at age 44    HPI:  No concerns today. Health Maintenance:  Vaccines: reviewed as below.  Indicated today: Shingrix    Immunization History  Administered Hypertension Mother    • Heart Disorder Mother         rheumatic fever   • Thyroid Disorder Sister    • Arthritis Sister    • No Known Problems Brother    • No Known Problems Sister    • Cancer Maternal Grandmother         stomach   • Cancer Maternal [place] for this visit:    Well woman exam without gynecological exam  Vaccines: Indicated today: UTD  Obesity screening: Body mass index is 22.3 kg/m².  Discussed healthy diet and exercise in detail today  Hypertension screening: normotensive  Depression screening

## 2021-08-18 ENCOUNTER — HOSPITAL ENCOUNTER (OUTPATIENT)
Dept: MAMMOGRAPHY | Age: 56
Discharge: HOME | End: 2021-08-18
Attending: FAMILY MEDICINE
Payer: COMMERCIAL

## 2021-08-18 DIAGNOSIS — Z12.31 VISIT FOR SCREENING MAMMOGRAM: ICD-10-CM

## 2021-08-18 PROCEDURE — 77063 BREAST TOMOSYNTHESIS BI: CPT | Performed by: FAMILY MEDICINE

## 2021-08-18 PROCEDURE — 77067 SCR MAMMO BI INCL CAD: CPT | Performed by: FAMILY MEDICINE

## 2021-10-27 ENCOUNTER — HOSPITAL ENCOUNTER (OUTPATIENT)
Age: 56
Discharge: HOME | End: 2021-10-27
Attending: EMERGENCY MEDICINE
Payer: COMMERCIAL

## 2021-10-27 VITALS
DIASTOLIC BLOOD PRESSURE: 88 MMHG | OXYGEN SATURATION: 97 % | SYSTOLIC BLOOD PRESSURE: 138 MMHG | TEMPERATURE: 99 F | HEART RATE: 78 BPM | RESPIRATION RATE: 18 BRPM

## 2021-10-27 DIAGNOSIS — J02.0 STREP PHARYNGITIS: Primary | ICD-10-CM

## 2021-10-27 PROCEDURE — 99212 OFFICE O/P EST SF 10 MIN: CPT

## 2021-10-27 PROCEDURE — 87880 STREP A ASSAY W/OPTIC: CPT

## 2021-10-27 NOTE — ED PROVIDER NOTES
Patient Seen in: [facility]      History   Patient presents with:  Sore Throat: Wanting to be tested for strep - Entered by patient    Stated Complaint: Fever - Wanting to be tested for strep    Subjective:   HPI  Pt is 59 yo F who p/w mild s Labs Reviewed   POCT RAPID STREP - Abnormal; Notable for the following components:       Result Value    POCT Rapid Strep Positive (*)     All other components within normal limits   RAPID SARS-COV-2 BY PCR          Pt notified of results.  Tolerating P

## 2021-11-15 ENCOUNTER — HOSPITAL ENCOUNTER (OUTPATIENT)
Age: 56
Discharge: HOME | End: 2021-11-15
Payer: COMMERCIAL

## 2021-11-15 ENCOUNTER — HOSPITAL ENCOUNTER (OUTPATIENT)
Age: 56
Discharge: HOME | End: 2021-11-15
Attending: EMERGENCY MEDICINE
Payer: COMMERCIAL

## 2021-11-15 VITALS
RESPIRATION RATE: 18 BRPM | TEMPERATURE: 98 F | DIASTOLIC BLOOD PRESSURE: 67 MMHG | SYSTOLIC BLOOD PRESSURE: 139 MMHG | HEART RATE: 75 BPM | OXYGEN SATURATION: 100 %

## 2021-11-15 VITALS
RESPIRATION RATE: 18 BRPM | OXYGEN SATURATION: 98 % | SYSTOLIC BLOOD PRESSURE: 151 MMHG | TEMPERATURE: 98 F | HEART RATE: 80 BPM | DIASTOLIC BLOOD PRESSURE: 73 MMHG

## 2021-11-15 DIAGNOSIS — J02.9 VIRAL PHARYNGITIS: Primary | ICD-10-CM

## 2021-11-15 DIAGNOSIS — J02.9 SORE THROAT: Primary | ICD-10-CM

## 2021-11-15 PROCEDURE — 99212 OFFICE O/P EST SF 10 MIN: CPT

## 2021-11-15 PROCEDURE — 99213 OFFICE O/P EST LOW 20 MIN: CPT

## 2021-11-15 PROCEDURE — 87880 STREP A ASSAY W/OPTIC: CPT

## 2021-11-15 NOTE — ED PROVIDER NOTES
Patient Seen in: [facility]      History   Patient presents with:  Fever: Want to get a strep test.  Was positive several weeks ago and feeling like it might not have fully resolved - Entered by patient    Stated Complaint: Fever - Want to ge Pupils: Pupils are equal, round, and reactive to light. Cardiovascular:      Rate and Rhythm: Normal rate. Pulmonary:      Effort: Pulmonary effort is normal.   Abdominal:      General: Abdomen is flat.    Musculoskeletal:         General: Normal range

## 2021-11-15 NOTE — ED PROVIDER NOTES
Patient Seen in: [facility]      History   Patient presents with:  Covid-19 Test: I was in this morning and tested negative for strep.   I should have taken rapid covid just to be safe, bc in taking my elderly dad to post surgery appt tomorrow Head: Normocephalic and atraumatic. Nose: Nose normal.      Mouth/Throat:      Mouth: Mucous membranes are moist.      Pharynx: Oropharynx is clear.    Eyes:      Conjunctiva/sclera: Conjunctivae normal.      Pupils: Pupils are equal, round, and reacti

## 2021-11-20 ENCOUNTER — IMMUNIZATION (OUTPATIENT)
Dept: LAB | Facility: HOSPITAL | Age: 56
End: 2021-11-20
Attending: EMERGENCY MEDICINE
Payer: COMMERCIAL

## 2021-11-20 DIAGNOSIS — Z23 NEED FOR VACCINATION: Primary | ICD-10-CM

## 2021-11-20 PROCEDURE — 0004A SARSCOV2 VAC 30MCG/0.3ML IM: CPT

## 2022-01-05 ENCOUNTER — TELEPHONE (OUTPATIENT)
Dept: FAMILY MEDICINE CLINIC | Facility: CLINIC | Age: 57
End: 2022-01-05

## 2022-01-05 ENCOUNTER — TELEMEDICINE (OUTPATIENT)
Dept: FAMILY MEDICINE CLINIC | Facility: CLINIC | Age: 57
End: 2022-01-05

## 2022-01-05 DIAGNOSIS — R68.83 CHILLS: Primary | ICD-10-CM

## 2022-01-05 DIAGNOSIS — R09.81 NASAL CONGESTION: ICD-10-CM

## 2022-01-05 PROCEDURE — 99213 OFFICE O/P EST LOW 20 MIN: CPT | Performed by: FAMILY MEDICINE

## 2022-01-05 NOTE — TELEPHONE ENCOUNTER
Called [facility] to call back to schedule a video visit with Dr Dyer Reason today HPI and Plan:   See dictation    No orders of the defined types were placed in this encounter.      Orders Placed This Encounter   Medications     METHYLPREDNISOLONE PO     Sig: Take 4 mg by mouth 20 mg  Daily,  Take as directed     cyclobenzaprine (FLEXERIL) 10 MG tablet     Sig: Take 10 mg by mouth daily       There are no discontinued medications.      Encounter Diagnoses   Name Primary?     Atypical chest pain      History of COVID-19      Chest pain, unspecified type      Preop general physical exam      Nonspecific abnormal electrocardiogram (ECG) (EKG)        CURRENT MEDICATIONS:  Current Outpatient Medications   Medication Sig Dispense Refill     acetaminophen (TYLENOL) 500 MG tablet Take 500-1,000 mg by mouth every 6 hours as needed for mild pain       cyclobenzaprine (FLEXERIL) 10 MG tablet Take 10 mg by mouth daily       METHYLPREDNISOLONE PO Take 4 mg by mouth 20 mg  Daily,  Take as directed         ALLERGIES     Allergies   Allergen Reactions     Seasonal Allergies        PAST MEDICAL HISTORY:  History reviewed. No pertinent past medical history.    PAST SURGICAL HISTORY:  Past Surgical History:   Procedure Laterality Date     SHOULDER ARTHROSCOPY W/ SUPERIOR LABRAL ANTERIOR POSTERIOR REPAIR Right 2001       FAMILY HISTORY:  Family History   Problem Relation Age of Onset     No Known Problems Mother      Hyperlipidemia Father      Hypertension Father        SOCIAL HISTORY:  Social History     Socioeconomic History     Marital status:      Spouse name: None     Number of children: None     Years of education: None     Highest education level: None   Occupational History     None   Tobacco Use     Smoking status: Never Smoker     Smokeless tobacco: Never Used   Substance and Sexual Activity     Alcohol use: Yes     Comment: 2 drinks week     Drug use: None     Sexual activity: None   Other Topics Concern     Parent/sibling w/ CABG, MI or angioplasty before 65F 55M? Not Asked   Social History  "Narrative     None     Social Determinants of Health     Financial Resource Strain:      Difficulty of Paying Living Expenses:    Food Insecurity:      Worried About Running Out of Food in the Last Year:      Ran Out of Food in the Last Year:    Transportation Needs:      Lack of Transportation (Medical):      Lack of Transportation (Non-Medical):    Physical Activity:      Days of Exercise per Week:      Minutes of Exercise per Session:    Stress:      Feeling of Stress :    Social Connections:      Frequency of Communication with Friends and Family:      Frequency of Social Gatherings with Friends and Family:      Attends Alevism Services:      Active Member of Clubs or Organizations:      Attends Club or Organization Meetings:      Marital Status:    Intimate Partner Violence:      Fear of Current or Ex-Partner:      Emotionally Abused:      Physically Abused:      Sexually Abused:        Review of Systems:  Skin:  Negative       Eyes:  Negative      ENT:  Negative      Respiratory:  Negative       Cardiovascular:  Negative;palpitations;chest pain;syncope or near-syncope;cyanosis;dizziness;lightheadedness;edema;fatigue exercise intolerance;Positive for    Gastroenterology: Negative      Genitourinary:  Negative      Musculoskeletal:  Positive for back pain    Neurologic:  Negative      Psychiatric:  Positive for sleep disturbances    Heme/Lymph/Imm:  Negative      Endocrine:  Negative        Physical Exam:  Vitals: BP (!) 124/94 (BP Location: Left arm, Cuff Size: Adult Large)   Pulse 90   Ht 1.778 m (5' 10\")   Wt 84.2 kg (185 lb 9.6 oz)   BMI 26.63 kg/m      Constitutional:  cooperative        Skin:  warm and dry to the touch          Head:  normocephalic        Eyes:  pupils equal and round        Lymph:      ENT:  no pallor or cyanosis        Neck:  no carotid bruit        Respiratory:  clear to auscultation;normal symmetry         Cardiac: regular rhythm;no murmurs, gallops or rubs detected              "   pulses full and equal                                        GI:  abdomen soft;no bruits        Extremities and Muscular Skeletal:  no deformities, clubbing, cyanosis, erythema observed;no edema              Neurological:  no gross motor deficits;affect appropriate        Psych:  Alert and Oriented x 3          CC  ROLAND Bartlett CNP  [address] [place] [address]

## 2022-01-05 NOTE — PROGRESS NOTES
Patient presents with:  Nasal Congestion  Body ache and/or chills     Tracey Mccracken is a 60year old female who presents via video encounter. HPI:   A fe days ago feeling nasal congestion.   Didn't think much of it, but yesterday started getting

## 2022-01-06 ENCOUNTER — LAB ENCOUNTER (OUTPATIENT)
Dept: LAB | Age: 57
End: 2022-01-06
Attending: FAMILY MEDICINE
Payer: COMMERCIAL

## 2022-01-06 DIAGNOSIS — R68.83 CHILLS: ICD-10-CM

## 2022-01-07 LAB — SARS-COV-2 RNA RESP QL NAA+PROBE: NOT DETECTED

## 2022-02-21 ENCOUNTER — TELEMEDICINE (OUTPATIENT)
Dept: FAMILY MEDICINE CLINIC | Facility: CLINIC | Age: 57
End: 2022-02-21

## 2022-02-21 DIAGNOSIS — R19.7 DIARRHEA, UNSPECIFIED TYPE: Primary | ICD-10-CM

## 2022-02-21 PROCEDURE — 99213 OFFICE O/P EST LOW 20 MIN: CPT | Performed by: FAMILY MEDICINE

## 2022-02-22 ENCOUNTER — LAB ENCOUNTER (OUTPATIENT)
Dept: LAB | Age: 57
End: 2022-02-22
Attending: FAMILY MEDICINE
Payer: COMMERCIAL

## 2022-02-22 DIAGNOSIS — R19.7 DIARRHEA, UNSPECIFIED TYPE: ICD-10-CM

## 2022-02-22 PROCEDURE — 87045 FECES CULTURE AEROBIC BACT: CPT

## 2022-02-22 PROCEDURE — 87427 SHIGA-LIKE TOXIN AG IA: CPT

## 2022-02-22 PROCEDURE — 87046 STOOL CULTR AEROBIC BACT EA: CPT

## 2022-02-22 PROCEDURE — 87493 C DIFF AMPLIFIED PROBE: CPT

## 2022-02-23 ENCOUNTER — PATIENT MESSAGE (OUTPATIENT)
Dept: FAMILY MEDICINE CLINIC | Facility: CLINIC | Age: 57
End: 2022-02-23

## 2022-02-23 LAB — C DIFF TOX B STL QL: NEGATIVE

## 2022-02-23 NOTE — TELEPHONE ENCOUNTER
From: Lorena Agosto  To: Bart Hazel DO  Sent: 2/23/2022 2:22 PM CST  Subject: Question regarding Katerin Donahue, I received results, both negative. I do have one question and an update. Question: I did accidentally refrigerate the c diff sample for about 2.5 hours before taking it in. Apparently that was supposed to be kept at room temp. Would that have affected results? Update: took 2 tablets of Imodium yesterday morning and bowels gurgled all day, but no bouts of diarrhea. Today, I've eaten 2 small meals and have had 2 bouts of diarrhea, but not as much in volume as past couple of weeks. I am thinking to take another dose of imodium today, and see how things go.

## 2022-07-27 ENCOUNTER — TELEPHONE (OUTPATIENT)
Dept: FAMILY MEDICINE CLINIC | Facility: CLINIC | Age: 57
End: 2022-07-27

## 2022-07-27 DIAGNOSIS — E55.9 VITAMIN D DEFICIENCY: ICD-10-CM

## 2022-07-27 DIAGNOSIS — Z00.00 ROUTINE HEALTH MAINTENANCE: Primary | ICD-10-CM

## 2022-07-27 NOTE — TELEPHONE ENCOUNTER
Please enter lab orders for the patient's upcoming physical appointment. Physical scheduled: Your appointments     Date & Time Appointment Department [facility])    Aug 26, 2022 11:00 AM CDT Adult Physical with Curvin Grammes,  [address])    PLEASE NOTE - Most insurances allow a Complete Physical once every 366 days. Please schedule accordingly. Please arrive 15 minutes prior to your scheduled appointment. Please also bring your Insurance card, Photo ID, and your medication bottles or a list of your current medication. If you no longer require this appointment, please contact your physician office to cancel. Charley Copeland [address] [address] [phone]         Preferred lab: [facility]     The patient has been notified to complete fasting labs prior to their physical appointment.

## 2022-08-19 ENCOUNTER — LAB ENCOUNTER (OUTPATIENT)
Dept: LAB | Facility: REFERENCE LAB | Age: 57
End: 2022-08-19
Attending: FAMILY MEDICINE
Payer: COMMERCIAL

## 2022-08-19 LAB
ALBUMIN SERPL-MCNC: 3.8 G/DL (ref 3.4–5)
ALBUMIN/GLOB SERPL: 1.2 {RATIO} (ref 1–2)
ALP LIVER SERPL-CCNC: 63 U/L
ALT SERPL-CCNC: 27 U/L
ANION GAP SERPL CALC-SCNC: 6 MMOL/L (ref 0–18)
AST SERPL-CCNC: 23 U/L (ref 15–37)
BASOPHILS # BLD AUTO: 0.06 X10(3) UL (ref 0–0.2)
BASOPHILS NFR BLD AUTO: 1.2 %
BILIRUB SERPL-MCNC: 0.5 MG/DL (ref 0.1–2)
BUN BLD-MCNC: 12 MG/DL (ref 7–18)
BUN/CREAT SERPL: 16.9 (ref 10–20)
CALCIUM BLD-MCNC: 9.1 MG/DL (ref 8.5–10.1)
CHLORIDE SERPL-SCNC: 104 MMOL/L (ref 98–112)
CHOLEST SERPL-MCNC: 197 MG/DL (ref ?–200)
CO2 SERPL-SCNC: 28 MMOL/L (ref 21–32)
CREAT BLD-MCNC: 0.71 MG/DL
DEPRECATED RDW RBC AUTO: 44.2 FL (ref 35.1–46.3)
EOSINOPHIL # BLD AUTO: 0.06 X10(3) UL (ref 0–0.7)
EOSINOPHIL NFR BLD AUTO: 1.2 %
ERYTHROCYTE [DISTWIDTH] IN BLOOD BY AUTOMATED COUNT: 12.2 % (ref 11–15)
FASTING PATIENT LIPID ANSWER: YES
FASTING STATUS PATIENT QL REPORTED: YES
GFR SERPLBLD BASED ON 1.73 SQ M-ARVRAT: 98 ML/MIN/1.73M2 (ref 60–?)
GLOBULIN PLAS-MCNC: 3.2 G/DL (ref 2.8–4.4)
GLUCOSE BLD-MCNC: 79 MG/DL (ref 70–99)
HCT VFR BLD AUTO: 40.7 %
HDLC SERPL-MCNC: 108 MG/DL (ref 40–59)
HGB BLD-MCNC: 13.2 G/DL
IMM GRANULOCYTES # BLD AUTO: 0.01 X10(3) UL (ref 0–1)
IMM GRANULOCYTES NFR BLD: 0.2 %
LDLC SERPL CALC-MCNC: 80 MG/DL (ref ?–100)
LYMPHOCYTES # BLD AUTO: 1.77 X10(3) UL (ref 1–4)
LYMPHOCYTES NFR BLD AUTO: 35.8 %
MCH RBC QN AUTO: 31.7 PG (ref 26–34)
MCHC RBC AUTO-ENTMCNC: 32.4 G/DL (ref 31–37)
MCV RBC AUTO: 97.8 FL
MONOCYTES # BLD AUTO: 0.59 X10(3) UL (ref 0.1–1)
MONOCYTES NFR BLD AUTO: 11.9 %
NEUTROPHILS # BLD AUTO: 2.46 X10 (3) UL (ref 1.5–7.7)
NEUTROPHILS # BLD AUTO: 2.46 X10(3) UL (ref 1.5–7.7)
NEUTROPHILS NFR BLD AUTO: 49.7 %
NONHDLC SERPL-MCNC: 89 MG/DL (ref ?–130)
OSMOLALITY SERPL CALC.SUM OF ELEC: 285 MOSM/KG (ref 275–295)
PLATELET # BLD AUTO: 217 10(3)UL (ref 150–450)
POTASSIUM SERPL-SCNC: 4 MMOL/L (ref 3.5–5.1)
PROT SERPL-MCNC: 7 G/DL (ref 6.4–8.2)
RBC # BLD AUTO: 4.16 X10(6)UL
SODIUM SERPL-SCNC: 138 MMOL/L (ref 136–145)
TRIGL SERPL-MCNC: 48 MG/DL (ref 30–149)
TSI SER-ACNC: 2.52 MIU/ML (ref 0.36–3.74)
VIT D+METAB SERPL-MCNC: 40.6 NG/ML (ref 30–100)
VLDLC SERPL CALC-MCNC: 7 MG/DL (ref 0–30)
WBC # BLD AUTO: 5 X10(3) UL (ref 4–11)

## 2022-08-19 PROCEDURE — 80061 LIPID PANEL: CPT | Performed by: FAMILY MEDICINE

## 2022-08-19 PROCEDURE — 85025 COMPLETE CBC W/AUTO DIFF WBC: CPT | Performed by: FAMILY MEDICINE

## 2022-08-19 PROCEDURE — 82306 VITAMIN D 25 HYDROXY: CPT | Performed by: FAMILY MEDICINE

## 2022-08-19 PROCEDURE — 36415 COLL VENOUS BLD VENIPUNCTURE: CPT | Performed by: FAMILY MEDICINE

## 2022-08-19 PROCEDURE — 80053 COMPREHEN METABOLIC PANEL: CPT | Performed by: FAMILY MEDICINE

## 2022-08-19 PROCEDURE — 84443 ASSAY THYROID STIM HORMONE: CPT | Performed by: FAMILY MEDICINE

## 2022-08-26 ENCOUNTER — OFFICE VISIT (OUTPATIENT)
Dept: FAMILY MEDICINE CLINIC | Facility: CLINIC | Age: 57
End: 2022-08-26
Payer: COMMERCIAL

## 2022-08-26 VITALS
OXYGEN SATURATION: 98 % | RESPIRATION RATE: 16 BRPM | DIASTOLIC BLOOD PRESSURE: 64 MMHG | HEIGHT: 65 IN | SYSTOLIC BLOOD PRESSURE: 110 MMHG | HEART RATE: 60 BPM | TEMPERATURE: 97 F | WEIGHT: 135 LBS | BODY MASS INDEX: 22.49 KG/M2

## 2022-08-26 DIAGNOSIS — Z12.31 VISIT FOR SCREENING MAMMOGRAM: ICD-10-CM

## 2022-08-26 DIAGNOSIS — Z00.00 WELL WOMAN EXAM WITHOUT GYNECOLOGICAL EXAM: Primary | ICD-10-CM

## 2022-08-26 DIAGNOSIS — Z12.11 SCREENING FOR COLON CANCER: ICD-10-CM

## 2022-08-26 DIAGNOSIS — R92.2 DENSE BREAST TISSUE ON MAMMOGRAM: ICD-10-CM

## 2022-08-26 PROCEDURE — 99396 PREV VISIT EST AGE 40-64: CPT | Performed by: FAMILY MEDICINE

## 2022-08-26 PROCEDURE — 3008F BODY MASS INDEX DOCD: CPT | Performed by: FAMILY MEDICINE

## 2022-08-26 PROCEDURE — 3078F DIAST BP <80 MM HG: CPT | Performed by: FAMILY MEDICINE

## 2022-08-26 PROCEDURE — 3074F SYST BP LT 130 MM HG: CPT | Performed by: FAMILY MEDICINE

## 2022-09-06 ENCOUNTER — HOSPITAL ENCOUNTER (OUTPATIENT)
Dept: MAMMOGRAPHY | Age: 57
Discharge: HOME | End: 2022-09-06
Attending: FAMILY MEDICINE
Payer: COMMERCIAL

## 2022-09-06 DIAGNOSIS — Z12.31 VISIT FOR SCREENING MAMMOGRAM: ICD-10-CM

## 2022-09-06 PROCEDURE — 77067 SCR MAMMO BI INCL CAD: CPT | Performed by: FAMILY MEDICINE

## 2022-09-06 PROCEDURE — 77063 BREAST TOMOSYNTHESIS BI: CPT | Performed by: FAMILY MEDICINE

## 2022-10-19 ENCOUNTER — TELEPHONE (OUTPATIENT)
Dept: FAMILY MEDICINE CLINIC | Facility: CLINIC | Age: 57
End: 2022-10-19

## 2022-10-19 NOTE — TELEPHONE ENCOUNTER
Pt tested positive for COVID on Sunday. Has lingering cough with thick discharge. Unable to schedule her for video visit. Pt would like to know if there is something that can be sent to pharmacy for her.

## 2022-10-19 NOTE — TELEPHONE ENCOUNTER
Called and talked to patient and went over OTC medications such as sudafed mucinexDM and tylenol sheis getting better except for the mucous.  Breathing OK will call back if she needs something

## 2022-10-24 ENCOUNTER — HOSPITAL ENCOUNTER (OUTPATIENT)
Age: 57
Discharge: HOME | End: 2022-10-24
Payer: COMMERCIAL

## 2022-10-24 VITALS
OXYGEN SATURATION: 96 % | TEMPERATURE: 98 F | RESPIRATION RATE: 18 BRPM | DIASTOLIC BLOOD PRESSURE: 89 MMHG | HEART RATE: 99 BPM | SYSTOLIC BLOOD PRESSURE: 151 MMHG

## 2022-10-24 DIAGNOSIS — U07.1 COVID-19: ICD-10-CM

## 2022-10-24 DIAGNOSIS — R07.89 CHEST TIGHTNESS: Primary | ICD-10-CM

## 2022-10-24 LAB
#MXD IC: 0.7 X10ˆ3/UL (ref 0.1–1)
BUN BLD-MCNC: 9 MG/DL (ref 7–18)
CHLORIDE BLD-SCNC: 99 MMOL/L (ref 98–112)
CO2 BLD-SCNC: 30 MMOL/L (ref 21–32)
CREAT BLD-MCNC: 0.7 MG/DL
DDIMER WHOLE BLOOD: <200 NG/ML DDU (ref ?–400)
GFR SERPLBLD BASED ON 1.73 SQ M-ARVRAT: 100 ML/MIN/1.73M2 (ref 60–?)
GLUCOSE BLD-MCNC: 99 MG/DL (ref 70–99)
HCT VFR BLD AUTO: 42.6 %
HCT VFR BLD CALC: 46 %
HGB BLD-MCNC: 14 G/DL
ISTAT IONIZED CALCIUM FOR CHEM 8: 1.2 MMOL/L (ref 1.12–1.32)
LYMPHOCYTES # BLD AUTO: 1.1 X10ˆ3/UL (ref 1–4)
LYMPHOCYTES NFR BLD AUTO: 24.8 %
MCH RBC QN AUTO: 30.8 PG (ref 26–34)
MCHC RBC AUTO-ENTMCNC: 32.9 G/DL (ref 31–37)
MCV RBC AUTO: 93.6 FL (ref 80–100)
MIXED CELL %: 15.1 %
NEUTROPHILS # BLD AUTO: 2.8 X10ˆ3/UL (ref 1.5–7.7)
NEUTROPHILS NFR BLD AUTO: 60.1 %
PLATELET # BLD AUTO: 214 X10ˆ3/UL (ref 150–450)
POTASSIUM BLD-SCNC: 4.8 MMOL/L (ref 3.6–5.1)
RBC # BLD AUTO: 4.55 X10ˆ6/UL
SODIUM BLD-SCNC: 138 MMOL/L (ref 136–145)
TROPONIN I BLD-MCNC: <0.02 NG/ML
WBC # BLD AUTO: 4.6 X10ˆ3/UL (ref 4–11)

## 2022-10-24 PROCEDURE — 85025 COMPLETE CBC W/AUTO DIFF WBC: CPT | Performed by: PHYSICIAN ASSISTANT

## 2022-10-24 PROCEDURE — 93010 ELECTROCARDIOGRAM REPORT: CPT | Performed by: PHYSICIAN ASSISTANT

## 2022-10-24 PROCEDURE — 99214 OFFICE O/P EST MOD 30 MIN: CPT

## 2022-10-24 PROCEDURE — 80047 BASIC METABLC PNL IONIZED CA: CPT

## 2022-10-24 PROCEDURE — 99213 OFFICE O/P EST LOW 20 MIN: CPT

## 2022-10-24 PROCEDURE — 85378 FIBRIN DEGRADE SEMIQUANT: CPT | Performed by: PHYSICIAN ASSISTANT

## 2022-10-24 PROCEDURE — 36415 COLL VENOUS BLD VENIPUNCTURE: CPT

## 2022-10-24 PROCEDURE — 84484 ASSAY OF TROPONIN QUANT: CPT

## 2022-10-24 PROCEDURE — 93005 ELECTROCARDIOGRAM TRACING: CPT

## 2022-10-24 NOTE — ED INITIAL ASSESSMENT (HPI)
PATIENT ARRIVED AMBULATORY TO ROOM. PATIENT STATES SHE TESTED POSITIVE FOR COVID 8 DAYS AGO. PATIENT C/O OCCASIONAL SOB AND RIGHT SIDED CHEST TIGHTNESS. EASY NON LABORED RESPIRATIONS.

## 2022-11-11 ENCOUNTER — IMMUNIZATION (OUTPATIENT)
Dept: LAB | Age: 57
End: 2022-11-11
Attending: EMERGENCY MEDICINE
Payer: COMMERCIAL

## 2022-11-11 DIAGNOSIS — Z23 NEED FOR VACCINATION: Primary | ICD-10-CM

## 2022-11-11 PROCEDURE — 90471 IMMUNIZATION ADMIN: CPT

## 2022-11-11 PROCEDURE — 90686 IIV4 VACC NO PRSV 0.5 ML IM: CPT

## 2022-12-27 ENCOUNTER — IMMUNIZATION (OUTPATIENT)
Dept: LAB | Age: 57
End: 2022-12-27
Attending: EMERGENCY MEDICINE
Payer: COMMERCIAL

## 2022-12-27 DIAGNOSIS — Z23 NEED FOR VACCINATION: Primary | ICD-10-CM

## 2022-12-27 PROCEDURE — 0124A SARSCOV2 VAC BVL 30MCG/0.3ML: CPT

## 2023-05-03 PROBLEM — R19.7 DIARRHEA: Status: ACTIVE | Noted: 2023-05-03

## 2023-05-03 PROBLEM — D12.2 BENIGN NEOPLASM OF ASCENDING COLON: Status: ACTIVE | Noted: 2023-05-03

## 2023-07-28 ENCOUNTER — TELEPHONE (OUTPATIENT)
Dept: FAMILY MEDICINE CLINIC | Facility: CLINIC | Age: 58
End: 2023-07-28

## 2023-07-28 DIAGNOSIS — Z00.00 ROUTINE HEALTH MAINTENANCE: ICD-10-CM

## 2023-07-28 DIAGNOSIS — E55.9 VITAMIN D DEFICIENCY: Primary | ICD-10-CM

## 2023-07-28 NOTE — TELEPHONE ENCOUNTER
Please enter lab orders for the patient's upcoming physical appointment. Physical scheduled: Your appointments       Date & Time Appointment Department [facility])    Oct 03, 2023 11:00 AM CDT [facility] [address], [place] ([address])    PLEASE NOTE - Most insurances allow a Complete Physical once every 366 days. Please schedule accordingly. Please arrive 15 minutes prior to your scheduled appointment. Please also bring your Insurance card, Photo ID, and your medication bottles or a list of your current medication. If you no longer require this appointment, please contact your physician office to cancel. Ronny Watson [address] [place] [address] [phone]           Preferred lab: [facility]     The patient has been notified to complete fasting labs prior to their physical appointment.

## 2023-09-13 ENCOUNTER — LAB ENCOUNTER (OUTPATIENT)
Dept: LAB | Age: 58
End: 2023-09-13
Attending: FAMILY MEDICINE
Payer: COMMERCIAL

## 2023-09-13 DIAGNOSIS — E55.9 VITAMIN D DEFICIENCY: ICD-10-CM

## 2023-09-13 LAB — VIT D+METAB SERPL-MCNC: 42.2 NG/ML (ref 30–100)

## 2023-09-13 PROCEDURE — 85025 COMPLETE CBC W/AUTO DIFF WBC: CPT | Performed by: FAMILY MEDICINE

## 2023-09-13 PROCEDURE — 80061 LIPID PANEL: CPT | Performed by: FAMILY MEDICINE

## 2023-09-13 PROCEDURE — 80053 COMPREHEN METABOLIC PANEL: CPT | Performed by: FAMILY MEDICINE

## 2023-09-13 PROCEDURE — 82306 VITAMIN D 25 HYDROXY: CPT

## 2023-09-13 PROCEDURE — 36415 COLL VENOUS BLD VENIPUNCTURE: CPT | Performed by: FAMILY MEDICINE

## 2023-09-13 PROCEDURE — 84443 ASSAY THYROID STIM HORMONE: CPT | Performed by: FAMILY MEDICINE

## 2023-10-01 ENCOUNTER — IMMUNIZATION (OUTPATIENT)
Dept: LAB | Age: 58
End: 2023-10-01
Attending: EMERGENCY MEDICINE
Payer: COMMERCIAL

## 2023-10-01 DIAGNOSIS — Z23 NEED FOR VACCINATION: Primary | ICD-10-CM

## 2023-10-01 PROCEDURE — 90471 IMMUNIZATION ADMIN: CPT

## 2023-10-01 PROCEDURE — 90686 IIV4 VACC NO PRSV 0.5 ML IM: CPT

## 2023-10-10 ENCOUNTER — IMMUNIZATION (OUTPATIENT)
Dept: LAB | Age: 58
End: 2023-10-10
Attending: EMERGENCY MEDICINE
Payer: COMMERCIAL

## 2023-10-10 DIAGNOSIS — Z23 NEED FOR VACCINATION: Primary | ICD-10-CM

## 2023-10-10 PROCEDURE — 90480 ADMN SARSCOV2 VAC 1/ONLY CMP: CPT

## 2023-11-28 ENCOUNTER — OFFICE VISIT (OUTPATIENT)
Dept: FAMILY MEDICINE CLINIC | Facility: CLINIC | Age: 58
End: 2023-11-28
Payer: COMMERCIAL

## 2023-11-28 VITALS
BODY MASS INDEX: 20.99 KG/M2 | TEMPERATURE: 97 F | WEIGHT: 126 LBS | SYSTOLIC BLOOD PRESSURE: 134 MMHG | DIASTOLIC BLOOD PRESSURE: 66 MMHG | HEIGHT: 65 IN | RESPIRATION RATE: 16 BRPM | HEART RATE: 68 BPM

## 2023-11-28 DIAGNOSIS — Z13.820 SCREENING FOR OSTEOPOROSIS: ICD-10-CM

## 2023-11-28 DIAGNOSIS — F43.9 STRESS AT HOME: ICD-10-CM

## 2023-11-28 DIAGNOSIS — Z12.4 SCREENING FOR CERVICAL CANCER: ICD-10-CM

## 2023-11-28 DIAGNOSIS — Z01.419 WELL WOMAN EXAM WITH ROUTINE GYNECOLOGICAL EXAM: Primary | ICD-10-CM

## 2023-11-28 DIAGNOSIS — Z78.0 POSTMENOPAUSAL: ICD-10-CM

## 2023-11-28 PROCEDURE — 99396 PREV VISIT EST AGE 40-64: CPT | Performed by: FAMILY MEDICINE

## 2023-11-28 PROCEDURE — 3075F SYST BP GE 130 - 139MM HG: CPT | Performed by: FAMILY MEDICINE

## 2023-11-28 PROCEDURE — 3008F BODY MASS INDEX DOCD: CPT | Performed by: FAMILY MEDICINE

## 2023-11-28 PROCEDURE — 3078F DIAST BP <80 MM HG: CPT | Performed by: FAMILY MEDICINE

## 2023-11-30 ENCOUNTER — HOSPITAL ENCOUNTER (OUTPATIENT)
Dept: MAMMOGRAPHY | Age: 58
Discharge: HOME | End: 2023-11-30
Attending: FAMILY MEDICINE
Payer: COMMERCIAL

## 2023-11-30 DIAGNOSIS — Z12.31 SCREENING MAMMOGRAM FOR BREAST CANCER: ICD-10-CM

## 2023-11-30 PROCEDURE — 77067 SCR MAMMO BI INCL CAD: CPT | Performed by: FAMILY MEDICINE

## 2023-11-30 PROCEDURE — 77063 BREAST TOMOSYNTHESIS BI: CPT | Performed by: FAMILY MEDICINE

## 2023-12-01 ENCOUNTER — TELEPHONE (OUTPATIENT)
Dept: FAMILY MEDICINE CLINIC | Facility: CLINIC | Age: 58
End: 2023-12-01

## 2023-12-01 NOTE — TELEPHONE ENCOUNTER
Cosmo Denny with [facility] called to speak with nurse. She states that she has a pap for patient but she either needs to send it back to us or change test codes.     Please advise

## 2023-12-01 NOTE — TELEPHONE ENCOUNTER
[facility] states they changed test codes and we will need to send a new order with new test codes. Test for pap needs to be changed to test code [phone]. Explained to Padilla Peña at [address] [place] [address] that that code does not come up in our computer. Padilla Peña at [facility] states she will discuss with our rep to make sure we have all the new codes. In mean time I will need to print order and cross off code and change [phone] to [phone] and fax to [phone]. Done and faxed.

## 2024-02-14 ENCOUNTER — HOSPITAL ENCOUNTER (OUTPATIENT)
Dept: BONE DENSITY | Age: 59
Discharge: HOME | End: 2024-02-14
Attending: FAMILY MEDICINE
Payer: COMMERCIAL

## 2024-02-14 DIAGNOSIS — Z13.820 SCREENING FOR OSTEOPOROSIS: ICD-10-CM

## 2024-02-14 DIAGNOSIS — Z78.0 POSTMENOPAUSAL: ICD-10-CM

## 2024-02-14 PROCEDURE — 77080 DXA BONE DENSITY AXIAL: CPT | Performed by: FAMILY MEDICINE

## 2024-10-25 ENCOUNTER — IMMUNIZATION (OUTPATIENT)
Dept: LAB | Age: 59
End: 2024-10-25
Attending: EMERGENCY MEDICINE
Payer: COMMERCIAL

## 2024-10-25 DIAGNOSIS — Z23 NEED FOR VACCINATION: Primary | ICD-10-CM

## 2024-10-25 PROCEDURE — 90656 IIV3 VACC NO PRSV 0.5 ML IM: CPT

## 2024-10-25 PROCEDURE — 90471 IMMUNIZATION ADMIN: CPT

## 2024-10-30 ENCOUNTER — IMMUNIZATION (OUTPATIENT)
Dept: LAB | Age: 59
End: 2024-10-30
Attending: EMERGENCY MEDICINE
Payer: COMMERCIAL

## 2024-10-30 DIAGNOSIS — Z23 NEED FOR VACCINATION: Primary | ICD-10-CM

## 2024-10-30 PROCEDURE — 90480 ADMN SARSCOV2 VAC 1/ONLY CMP: CPT

## 2024-12-30 ENCOUNTER — TELEPHONE (OUTPATIENT)
Dept: FAMILY MEDICINE CLINIC | Facility: CLINIC | Age: 59
End: 2024-12-30

## 2024-12-30 DIAGNOSIS — Z00.00 ROUTINE HEALTH MAINTENANCE: ICD-10-CM

## 2024-12-30 DIAGNOSIS — E55.9 VITAMIN D DEFICIENCY: Primary | ICD-10-CM

## 2024-12-30 NOTE — TELEPHONE ENCOUNTER
Please enter lab orders for the patient's upcoming physical appointment.     Physical scheduled:   Your appointments       Date & Time Appointment Department ([place])    Jan 14, 2025 3:00 PM CST Adult Physical with Marisela Medeiros DO [facility], [facility] ([address] [facility] [address])    PLEASE NOTE - Most insurances allow a Complete Physical once every 366 days. Please schedule accordingly.    Please arrive 15 minutes prior to your scheduled appointment. Please also bring your Insurance card, Photo ID, and your medication bottles or a list of your current medication.    If you no longer require this appointment, please contact your physician office to cancel.              [facility], [facility] [address]  [address] [place] [address] [id number] [phone]           Preferred lab: [facility]     The patient has been notified to complete fasting labs prior to their physical appointment.

## 2025-01-06 ENCOUNTER — HOSPITAL ENCOUNTER (OUTPATIENT)
Dept: MAMMOGRAPHY | Age: 60
Discharge: HOME | End: 2025-01-06
Attending: FAMILY MEDICINE
Payer: COMMERCIAL

## 2025-01-06 DIAGNOSIS — Z12.31 SCREENING MAMMOGRAM, ENCOUNTER FOR: ICD-10-CM

## 2025-01-06 PROCEDURE — 77067 SCR MAMMO BI INCL CAD: CPT | Performed by: FAMILY MEDICINE

## 2025-01-06 PROCEDURE — 77063 BREAST TOMOSYNTHESIS BI: CPT | Performed by: FAMILY MEDICINE

## 2025-02-03 ENCOUNTER — LAB ENCOUNTER (OUTPATIENT)
Dept: LAB | Age: 60
End: 2025-02-03
Attending: FAMILY MEDICINE
Payer: COMMERCIAL

## 2025-02-03 LAB
ALBUMIN SERPL-MCNC: 4.7 G/DL (ref 3.2–4.8)
ALBUMIN/GLOB SERPL: 1.7 {RATIO} (ref 1–2)
ALP LIVER SERPL-CCNC: 63 U/L
ALT SERPL-CCNC: 20 U/L
ANION GAP SERPL CALC-SCNC: 6 MMOL/L (ref 0–18)
AST SERPL-CCNC: 26 U/L (ref ?–34)
BASOPHILS # BLD AUTO: 0.05 X10(3) UL (ref 0–0.2)
BASOPHILS NFR BLD AUTO: 1 %
BILIRUB SERPL-MCNC: 0.7 MG/DL (ref 0.2–1.1)
BUN BLD-MCNC: 14 MG/DL (ref 9–23)
BUN/CREAT SERPL: 17.9 (ref 10–20)
CALCIUM BLD-MCNC: 9.2 MG/DL (ref 8.7–10.4)
CHLORIDE SERPL-SCNC: 101 MMOL/L (ref 98–112)
CHOLEST SERPL-MCNC: 207 MG/DL (ref ?–200)
CO2 SERPL-SCNC: 31 MMOL/L (ref 21–32)
CREAT BLD-MCNC: 0.78 MG/DL
DEPRECATED RDW RBC AUTO: 41 FL (ref 35.1–46.3)
EGFRCR SERPLBLD CKD-EPI 2021: 86 ML/MIN/1.73M2 (ref 60–?)
EOSINOPHIL # BLD AUTO: 0.09 X10(3) UL (ref 0–0.7)
EOSINOPHIL NFR BLD AUTO: 1.9 %
ERYTHROCYTE [DISTWIDTH] IN BLOOD BY AUTOMATED COUNT: 11.9 % (ref 11–15)
EST. AVERAGE GLUCOSE BLD GHB EST-MCNC: 117 MG/DL (ref 68–126)
FASTING PATIENT LIPID ANSWER: YES
FASTING STATUS PATIENT QL REPORTED: YES
GLOBULIN PLAS-MCNC: 2.7 G/DL (ref 2–3.5)
GLUCOSE BLD-MCNC: 92 MG/DL (ref 70–99)
HBA1C MFR BLD: 5.7 % (ref ?–5.7)
HCT VFR BLD AUTO: 40.3 %
HDLC SERPL-MCNC: 109 MG/DL (ref 40–59)
HGB BLD-MCNC: 14 G/DL
IMM GRANULOCYTES # BLD AUTO: 0.01 X10(3) UL (ref 0–1)
IMM GRANULOCYTES NFR BLD: 0.2 %
LDLC SERPL CALC-MCNC: 89 MG/DL (ref ?–100)
LYMPHOCYTES # BLD AUTO: 1.36 X10(3) UL (ref 1–4)
LYMPHOCYTES NFR BLD AUTO: 28 %
MCH RBC QN AUTO: 32.3 PG (ref 26–34)
MCHC RBC AUTO-ENTMCNC: 34.7 G/DL (ref 31–37)
MCV RBC AUTO: 92.9 FL
MONOCYTES # BLD AUTO: 0.51 X10(3) UL (ref 0.1–1)
MONOCYTES NFR BLD AUTO: 10.5 %
NEUTROPHILS # BLD AUTO: 2.84 X10 (3) UL (ref 1.5–7.7)
NEUTROPHILS # BLD AUTO: 2.84 X10(3) UL (ref 1.5–7.7)
NEUTROPHILS NFR BLD AUTO: 58.4 %
NONHDLC SERPL-MCNC: 98 MG/DL (ref ?–130)
OSMOLALITY SERPL CALC.SUM OF ELEC: 286 MOSM/KG (ref 275–295)
PLATELET # BLD AUTO: 242 10(3)UL (ref 150–450)
POTASSIUM SERPL-SCNC: 4.2 MMOL/L (ref 3.5–5.1)
PROT SERPL-MCNC: 7.4 G/DL (ref 5.7–8.2)
RBC # BLD AUTO: 4.34 X10(6)UL
SODIUM SERPL-SCNC: 138 MMOL/L (ref 136–145)
TRIGL SERPL-MCNC: 51 MG/DL (ref 30–149)
TSI SER-ACNC: 1.98 UIU/ML (ref 0.55–4.78)
VIT D+METAB SERPL-MCNC: 27 NG/ML (ref 30–100)
VLDLC SERPL CALC-MCNC: 8 MG/DL (ref 0–30)
WBC # BLD AUTO: 4.9 X10(3) UL (ref 4–11)

## 2025-02-03 PROCEDURE — 80053 COMPREHEN METABOLIC PANEL: CPT | Performed by: FAMILY MEDICINE

## 2025-02-03 PROCEDURE — 85025 COMPLETE CBC W/AUTO DIFF WBC: CPT | Performed by: FAMILY MEDICINE

## 2025-02-03 PROCEDURE — 82306 VITAMIN D 25 HYDROXY: CPT | Performed by: FAMILY MEDICINE

## 2025-02-03 PROCEDURE — 36415 COLL VENOUS BLD VENIPUNCTURE: CPT | Performed by: FAMILY MEDICINE

## 2025-02-03 PROCEDURE — 80061 LIPID PANEL: CPT | Performed by: FAMILY MEDICINE

## 2025-02-03 PROCEDURE — 83036 HEMOGLOBIN GLYCOSYLATED A1C: CPT | Performed by: FAMILY MEDICINE

## 2025-02-03 PROCEDURE — 84443 ASSAY THYROID STIM HORMONE: CPT | Performed by: FAMILY MEDICINE

## 2025-03-10 ENCOUNTER — OFFICE VISIT (OUTPATIENT)
Dept: FAMILY MEDICINE CLINIC | Facility: CLINIC | Age: 60
End: 2025-03-10
Payer: COMMERCIAL

## 2025-03-10 VITALS
RESPIRATION RATE: 16 BRPM | WEIGHT: 141.19 LBS | BODY MASS INDEX: 23.81 KG/M2 | SYSTOLIC BLOOD PRESSURE: 112 MMHG | DIASTOLIC BLOOD PRESSURE: 64 MMHG | HEIGHT: 64.61 IN | HEART RATE: 88 BPM | OXYGEN SATURATION: 100 % | TEMPERATURE: 98 F

## 2025-03-10 DIAGNOSIS — E55.9 VITAMIN D DEFICIENCY: ICD-10-CM

## 2025-03-10 DIAGNOSIS — R73.03 PREDIABETES: ICD-10-CM

## 2025-03-10 DIAGNOSIS — Z00.00 WELL WOMAN EXAM WITHOUT GYNECOLOGICAL EXAM: Primary | ICD-10-CM

## 2025-03-10 PROCEDURE — 99396 PREV VISIT EST AGE 40-64: CPT | Performed by: FAMILY MEDICINE

## 2025-03-10 NOTE — PROGRESS NOTES
SUBJECTIVE:  Chief Complaint   Patient presents with    Physical     WWE no breast exam / no pap     HPI:    History of Present Illness  The patient presents for an annual physical exam.    She is currently wearing a boot on her left foot due to a stress reaction in one or two of the metatarsals, as diagnosed by a podiatrist. X-rays did not show a stress fracture. The foot experiences swelling and pain by the end of the day, with a specific spot being very tender.    She has low vitamin D levels despite taking 1000 IU daily. In 2018, her levels were at 12, and she took rosmery doses at that time. She plans to increase her intake to 4000 IU daily for the next two months and then reduce to 2000 IU daily.    Her A1c is at 5.7%, which is in the prediabetic range. She is a  and maintains regular physical activity, though she acknowledges a slight increase in carbohydrate intake and weight over the past year. She has experienced a stressful year, with her father in hospice, which may have impacted her lifestyle choices.    She experiences anxiety, particularly due to her father's hospice situation and the responsibilities of supporting her mother and managing her house. She has had panic attacks in the past but finds relief through yoga and her sofía-based practices.      Health Maintenance:  Vaccines: reviewed as below. Indicated today: Tdap, prevnar, shingrix  Immunization History   Administered Date(s) Administered    Covid-19 Vaccine Pfizer 30 mcg/0.3 ml 04/03/2021, 04/24/2021, 11/20/2021    Covid-19 Vaccine Pfizer Bivalent 30mcg/0.3mL 12/27/2022    Covid-19 Vaccine Pfizer Cosme-Sucrose 30 mcg/0.3 ml 06/04/2022    FLUZONE 6 months and older PFS 0.5 ml (03828) 10/23/2015, 10/18/2016, 10/24/2016, 11/08/2017, 11/11/2021, 11/11/2022, 10/01/2023    Flucelvax 0.5 Ml Quad PFS Single Dose 10/21/2018    Fluvirin, 3 Years & >, Im 09/24/2012    Influenza 09/29/2014, 10/22/2015, 10/20/2018    Influenza Vaccine,  trivalent (IIV3), PF 0.5mL (96398) 10/25/2024    Influenza(Afluria)0.5ml QIV PFS 10/22/2019, 10/20/2020    Pfizer Covid-19 Vaccine 30mcg/0.3ml 12yrs+ 10/10/2023, 10/30/2024    TDAP 01/14/2015     Obesity screening: Body mass index is 23.78 kg/m².  Diabetes screening:  ELEVATED A1c 5.7%  Hypercholesterolemia screening:   Lab Results   Component Value Date     (H) 02/03/2025     (H) 09/13/2023     (H) 08/19/2022     Lab Results   Component Value Date    LDL 89 02/03/2025    LDL 80 09/13/2023    LDL 80 08/19/2022     Lab Results   Component Value Date    TRIG 51 02/03/2025    TRIG 48 09/13/2023    TRIG 48 08/19/2022        Depression screen: as asbove  Osteoporosis: No history of pathologic fractures. No steroid use, smoking, risk of falls, excessive alcohol use.  Cervical Cancer screening: Last Pap: 11/2023   Colon Cancer screening:  Last colonoscopy: 5/2023 (5 years)  Breast Cancer screening:  Last mammogram: 1/2025  STI: Desires testing for STIs? no  Tobacco use:  reports that she has quit smoking. She has never used smokeless tobacco.    ROS: Negative unless stated above    HISTORY:  Past Medical History:    Diarrhea, unspecified    Femoroacetabular impingement    L hip    Flatulence/gas pain/belching    Raynaud's phenomenon    Stress fracture of foot    Vitamin D deficiency    Wears glasses      History reviewed. No pertinent surgical history.   Family History   Problem Relation Age of Onset    Diabetes Father     Hypertension Father     Hypertension Mother     Heart Disorder Mother         rheumatic fever    Thyroid Disorder Sister     Arthritis Sister     No Known Problems Brother     No Known Problems Sister     Cancer Maternal Grandmother         stomach    Cancer Maternal Grandfather         throat      Social History     Socioeconomic History    Marital status:    Tobacco Use    Smoking status: Former    Smokeless tobacco: Never   Vaping Use    Vaping status: Never Used    Substance and Sexual Activity    Alcohol use: Yes     Alcohol/week: 5.0 standard drinks of alcohol     Types: 5 Glasses of wine per week     Comment: 5x week    Drug use: No   Other Topics Concern    Caffeine Concern Yes     Comment: 1 coffee daily    Exercise Yes     Comment: teach yoga daily    Seat Belt Yes    Self-Exams No        Allergies:  Allergies[1]    OBJECTIVE:  PHYSICAL EXAM:  Vitals:    03/10/25 1439   BP: 112/64   BP Location: Left arm   Pulse: 88   Resp: 16   Temp: 97.8 °F (36.6 °C)   TempSrc: Oral   SpO2: 100%   Weight: 141 lb 3.2 oz (64 kg)   Height: 5' 4.61\" (1.641 m)     Physical Examination: General appearance - alert, well appearing, and in no distress and normal appearing weight  Mental status - alert, oriented to person, place, and time, normal mood, behavior, speech, dress, motor activity, and thought processes  Ears - bilateral TM's and external ear canals normal  Neck - supple, no significant adenopathy  Chest - clear to auscultation, no wheezes, rales or rhonchi, symmetric air entry  Heart - normal rate, regular rhythm, normal S1, S2, no murmurs, rubs, clicks or gallops  Abdomen - soft, nontender, nondistended, no masses or organomegaly  Extremities - peripheral pulses normal, no pedal edema, no clubbing or cyanosis    ASSESSMENT & PLAN:  Lorena Agosto is a 59 year old female is here for Physical (WWE no breast exam / no pap)    Problem List Items Addressed This Visit    None  Visit Diagnoses       Well woman exam without gynecological exam    -  Primary          Lorena was seen today for physical.    Diagnoses and all orders for this visit:    Well woman exam without gynecological exam  Routine health maintenance reviewed, discussed and updated as below. Healthy diet and exercise reviewed.     Prediabetes  Vit D deficiency  Assessment & Plan  Stress Reaction of Metatarsals  Confirmed stress reaction in one or two metatarsals of the left foot. Currently managing with a boot for  three weeks.  - Continue wearing the boot for three weeks as advised by the podiatrist    Anxiety  Increased anxiety due to personal stressors, including caring for a parent in hospice. Finds relief through yoga and sofía-based practices. Aware of the option for further mental health support if needed.  - Continue current stress management techniques (yoga, sofía-based practices)  - Consider mental health support if anxiety becomes unmanageable    Prediabetes  A1c at 5.7%, threshold for prediabetes. Advised to monitor carbohydrate and sugar intake. Exercise levels adequate due to regular yoga practice. Option to recheck A1c in 3-6 months or next year discussed.  - Monitor carbohydrate and sugar intake  - Recheck A1c next year    Vitamin D Deficiency  Vitamin D level at 27 despite 1000 IU daily. Advised to increase to 4000 IU daily for two months, then reduce to 2000 IU daily for maintenance. No significant side effects expected. Discussed that low levels can worsen anxiety and depression.  - Increase Vitamin D intake to 4000 IU daily for two months  - Reduce Vitamin D intake to 2000 IU daily after two months    General Health Maintenance  Due for tetanus and pneumonia vaccines, considering shingles vaccine. Prefers to schedule vaccinations later due to personal commitments. Discussed side effects: sore arm for tetanus and pneumonia vaccines, flu-like symptoms for shingles vaccine (two doses, 2-6 months apart).  - Schedule tetanus and pneumonia vaccines at a later date  - Consider shingles vaccine series (two doses, 2-6 months apart)    Follow-up  - Schedule routine blood work next year  - Schedule Pap smear next year  - Schedule colon cancer screening in 2028  - Schedule mammogram as per routine.      Call or return to clinic prn if these symptoms worsen or fail to improve as anticipated. RTC in 1 year.   Marisela Medeiros DO  3/10/2025 3:04 PM    Note to Patient  The 21st Century Cures Act makes medical notes like  these available to patients in the interest of transparency. However, be advised this is a medical document and is intended as [id number] communication; it is written in medical language and may appear blunt, direct, or contain abbreviations or verbiage that are unfamiliar. Medical documents are intended to carry relevant information, facts as evident, and the clinical opinion of the practitioner.     This report has been produced using speech recognition software, and may contain errors related to grammar, punctuation, spelling, words or phrases unrecognized or not translated appropriately to text; these errors may be referred to the dictating provider for further clarification and/or addendum as needed.         [1]   Allergies  Allergen Reactions    Cephalosporins RASH    Penicillins RASH

## 2025-03-10 NOTE — PROGRESS NOTES
The following individual(s) verbally consented to be recorded using ambient AI listening technology and understand that they can each withdraw their consent to this listening technology at any point by asking the clinician to turn off or pause the recording:    Patient name: Lorena Agosto

## 2025-05-19 ENCOUNTER — PATIENT MESSAGE (OUTPATIENT)
Dept: FAMILY MEDICINE CLINIC | Facility: CLINIC | Age: 60
End: 2025-05-19

## 2025-05-19 DIAGNOSIS — Z23 NEED VACCINATION-VIRAL DISEASE: Primary | ICD-10-CM

## (undated) DIAGNOSIS — R19.7 DIARRHEA, UNSPECIFIED TYPE: Primary | ICD-10-CM

## (undated) NOTE — LETTER
Dear Dr. Naz Hamilton    This letter is to inform you that Jaison Munoz has been attending Physical Therapy with me. See below for my most recent plan of care.        Patient Name: Jaison Munoz, : 1965, MRN: [id number]   Date:

## (undated) NOTE — ED AVS SNAPSHOT
[facility] AND [facility] in [address].  [address]    Phone:  [phone]    Fax:  [facility]   MRN: [id number]    Department:  [facility] AND [facility] in [place]   Date of Vi service to you, we would appreciate any positive or negative feedback related to the care you received in our Immediate Care. Please call our [facility] at [phone]. Your Immediate Care team is here to serve you. You are our top priority.     You w [address] [facility]  WEEKENDS AND HOLIDAYS 8AM - 6PM    I certified that I have received a copy of the aftercare instructions; that these instructions have been explained to me; all questions pertaining to these instructions have been answered information, go to [email or web address] and click on the Sign Up Now link in the [employer] box. Enter your [employer] Activation Code exactly as it appears below along with your Zip Code and Date of Birth to complete the sign-up process.  If you do